# Patient Record
Sex: FEMALE | Race: ASIAN | NOT HISPANIC OR LATINO
[De-identification: names, ages, dates, MRNs, and addresses within clinical notes are randomized per-mention and may not be internally consistent; named-entity substitution may affect disease eponyms.]

---

## 2021-05-24 ENCOUNTER — RESULT REVIEW (OUTPATIENT)
Age: 31
End: 2021-05-24

## 2021-06-17 PROBLEM — Z00.00 ENCOUNTER FOR PREVENTIVE HEALTH EXAMINATION: Status: ACTIVE | Noted: 2021-06-17

## 2021-06-21 ENCOUNTER — APPOINTMENT (OUTPATIENT)
Dept: ANTEPARTUM | Facility: CLINIC | Age: 31
End: 2021-06-21
Payer: COMMERCIAL

## 2021-06-21 ENCOUNTER — ASOB RESULT (OUTPATIENT)
Age: 31
End: 2021-06-21

## 2021-06-21 PROCEDURE — 76805 OB US >/= 14 WKS SNGL FETUS: CPT

## 2021-06-21 PROCEDURE — 99072 ADDL SUPL MATRL&STAF TM PHE: CPT

## 2021-07-09 ENCOUNTER — APPOINTMENT (OUTPATIENT)
Dept: ANTEPARTUM | Facility: CLINIC | Age: 31
End: 2021-07-09
Payer: COMMERCIAL

## 2021-07-09 ENCOUNTER — ASOB RESULT (OUTPATIENT)
Age: 31
End: 2021-07-09

## 2021-07-09 DIAGNOSIS — O35.9XX0 MATERNAL CARE FOR (SUSPECTED) FETAL ABNORMALITY AND DAMAGE, UNSPECIFIED, NOT APPLICABLE OR UNSPECIFIED: ICD-10-CM

## 2021-07-09 PROCEDURE — 76816 OB US FOLLOW-UP PER FETUS: CPT

## 2021-07-09 PROCEDURE — 99072 ADDL SUPL MATRL&STAF TM PHE: CPT

## 2021-07-27 ENCOUNTER — APPOINTMENT (OUTPATIENT)
Dept: PEDIATRIC CARDIOLOGY | Facility: CLINIC | Age: 31
End: 2021-07-27
Payer: COMMERCIAL

## 2021-07-27 PROCEDURE — 99244 OFF/OP CNSLTJ NEW/EST MOD 40: CPT

## 2021-07-27 PROCEDURE — 76825 ECHO EXAM OF FETAL HEART: CPT

## 2021-07-27 PROCEDURE — 99072 ADDL SUPL MATRL&STAF TM PHE: CPT

## 2021-07-27 PROCEDURE — 76820 UMBILICAL ARTERY ECHO: CPT

## 2021-07-27 PROCEDURE — 76827 ECHO EXAM OF FETAL HEART: CPT

## 2021-07-27 PROCEDURE — 76821 MIDDLE CEREBRAL ARTERY ECHO: CPT

## 2021-07-27 PROCEDURE — 93325 DOPPLER ECHO COLOR FLOW MAPG: CPT | Mod: 59

## 2021-08-10 ENCOUNTER — APPOINTMENT (OUTPATIENT)
Dept: PEDIATRIC CARDIOLOGY | Facility: CLINIC | Age: 31
End: 2021-08-10
Payer: COMMERCIAL

## 2021-08-10 PROCEDURE — 76820 UMBILICAL ARTERY ECHO: CPT

## 2021-08-10 PROCEDURE — 76828 ECHO EXAM OF FETAL HEART: CPT | Mod: 59

## 2021-08-10 PROCEDURE — 76826 ECHO EXAM OF FETAL HEART: CPT

## 2021-08-10 PROCEDURE — 76821 MIDDLE CEREBRAL ARTERY ECHO: CPT

## 2021-08-10 PROCEDURE — 93325 DOPPLER ECHO COLOR FLOW MAPG: CPT | Mod: 59

## 2021-08-10 PROCEDURE — 99213 OFFICE O/P EST LOW 20 MIN: CPT

## 2021-09-03 ENCOUNTER — APPOINTMENT (OUTPATIENT)
Dept: ANTEPARTUM | Facility: CLINIC | Age: 31
End: 2021-09-03
Payer: COMMERCIAL

## 2021-09-03 ENCOUNTER — ASOB RESULT (OUTPATIENT)
Age: 31
End: 2021-09-03

## 2021-09-03 PROCEDURE — 76816 OB US FOLLOW-UP PER FETUS: CPT

## 2021-09-14 ENCOUNTER — APPOINTMENT (OUTPATIENT)
Dept: PEDIATRIC CARDIOLOGY | Facility: CLINIC | Age: 31
End: 2021-09-14
Payer: COMMERCIAL

## 2021-09-14 PROCEDURE — 76820 UMBILICAL ARTERY ECHO: CPT

## 2021-09-14 PROCEDURE — 93325 DOPPLER ECHO COLOR FLOW MAPG: CPT | Mod: 59

## 2021-09-14 PROCEDURE — 76821 MIDDLE CEREBRAL ARTERY ECHO: CPT

## 2021-09-14 PROCEDURE — 76827 ECHO EXAM OF FETAL HEART: CPT

## 2021-09-14 PROCEDURE — 99215 OFFICE O/P EST HI 40 MIN: CPT

## 2021-09-14 PROCEDURE — 76825 ECHO EXAM OF FETAL HEART: CPT

## 2021-10-15 ENCOUNTER — APPOINTMENT (OUTPATIENT)
Dept: ANTEPARTUM | Facility: CLINIC | Age: 31
End: 2021-10-15
Payer: COMMERCIAL

## 2021-10-15 ENCOUNTER — ASOB RESULT (OUTPATIENT)
Age: 31
End: 2021-10-15

## 2021-10-15 PROCEDURE — 76816 OB US FOLLOW-UP PER FETUS: CPT

## 2021-10-15 PROCEDURE — 76817 TRANSVAGINAL US OBSTETRIC: CPT

## 2021-10-25 ENCOUNTER — INPATIENT (INPATIENT)
Facility: HOSPITAL | Age: 31
LOS: 2 days | Discharge: HOME | End: 2021-10-28
Attending: OBSTETRICS & GYNECOLOGY | Admitting: OBSTETRICS & GYNECOLOGY
Payer: COMMERCIAL

## 2021-10-25 ENCOUNTER — TRANSCRIPTION ENCOUNTER (OUTPATIENT)
Age: 31
End: 2021-10-25

## 2021-10-25 ENCOUNTER — EMERGENCY (EMERGENCY)
Facility: HOSPITAL | Age: 31
LOS: 1 days | Discharge: STILL ADMITTED OR ADMINISTRATIVE | End: 2021-10-25
Admitting: EMERGENCY MEDICINE
Payer: SELF-PAY

## 2021-10-25 VITALS
RESPIRATION RATE: 16 BRPM | TEMPERATURE: 98 F | DIASTOLIC BLOOD PRESSURE: 82 MMHG | HEART RATE: 90 BPM | SYSTOLIC BLOOD PRESSURE: 119 MMHG

## 2021-10-25 VITALS — HEART RATE: 78 BPM | DIASTOLIC BLOOD PRESSURE: 71 MMHG | SYSTOLIC BLOOD PRESSURE: 116 MMHG

## 2021-10-25 DIAGNOSIS — Z3A.00 WEEKS OF GESTATION OF PREGNANCY NOT SPECIFIED: ICD-10-CM

## 2021-10-25 DIAGNOSIS — O20.8 OTHER HEMORRHAGE IN EARLY PREGNANCY: ICD-10-CM

## 2021-10-25 DIAGNOSIS — O42.90 PREMATURE RUPTURE OF MEMBRANES, UNSPECIFIED AS TO LENGTH OF TIME BETWEEN RUPTURE AND ONSET OF LABOR, UNSPECIFIED WEEKS OF GESTATION: ICD-10-CM

## 2021-10-25 LAB
ANISOCYTOSIS BLD QL: SLIGHT — SIGNIFICANT CHANGE UP
BASOPHILS # BLD AUTO: 0 K/UL — SIGNIFICANT CHANGE UP (ref 0–0.2)
BASOPHILS NFR BLD AUTO: 0 % — SIGNIFICANT CHANGE UP (ref 0–2)
BLD GP AB SCN SERPL QL: NEGATIVE — SIGNIFICANT CHANGE UP
COVID-19 SPIKE DOMAIN AB INTERP: POSITIVE
COVID-19 SPIKE DOMAIN ANTIBODY RESULT: >250 U/ML — HIGH
DACRYOCYTES BLD QL SMEAR: SLIGHT — SIGNIFICANT CHANGE UP
EOSINOPHIL # BLD AUTO: 0 K/UL — SIGNIFICANT CHANGE UP (ref 0–0.5)
EOSINOPHIL NFR BLD AUTO: 0 % — SIGNIFICANT CHANGE UP (ref 0–6)
GIANT PLATELETS BLD QL SMEAR: PRESENT — SIGNIFICANT CHANGE UP
GLUCOSE BLDC GLUCOMTR-MCNC: 108 MG/DL — HIGH (ref 70–99)
GLUCOSE BLDC GLUCOMTR-MCNC: 85 MG/DL — SIGNIFICANT CHANGE UP (ref 70–99)
GLUCOSE BLDC GLUCOMTR-MCNC: 85 MG/DL — SIGNIFICANT CHANGE UP (ref 70–99)
GLUCOSE BLDC GLUCOMTR-MCNC: 87 MG/DL — SIGNIFICANT CHANGE UP (ref 70–99)
GLUCOSE BLDC GLUCOMTR-MCNC: 89 MG/DL — SIGNIFICANT CHANGE UP (ref 70–99)
HCT VFR BLD CALC: 33.9 % — LOW (ref 34.5–45)
HGB BLD-MCNC: 10.8 G/DL — LOW (ref 11.5–15.5)
IANC: 9.78 K/UL — HIGH (ref 1.5–8.5)
LYMPHOCYTES # BLD AUTO: 14.4 % — SIGNIFICANT CHANGE UP (ref 13–44)
LYMPHOCYTES # BLD AUTO: 2.01 K/UL — SIGNIFICANT CHANGE UP (ref 1–3.3)
MANUAL SMEAR VERIFICATION: SIGNIFICANT CHANGE UP
MCHC RBC-ENTMCNC: 21.9 PG — LOW (ref 27–34)
MCHC RBC-ENTMCNC: 31.9 GM/DL — LOW (ref 32–36)
MCV RBC AUTO: 68.6 FL — LOW (ref 80–100)
MICROCYTES BLD QL: SLIGHT — SIGNIFICANT CHANGE UP
MONOCYTES # BLD AUTO: 0.38 K/UL — SIGNIFICANT CHANGE UP (ref 0–0.9)
MONOCYTES NFR BLD AUTO: 2.7 % — SIGNIFICANT CHANGE UP (ref 2–14)
MYELOCYTES NFR BLD: 0.9 % — HIGH (ref 0–0)
NEUTROPHILS # BLD AUTO: 10.2 K/UL — HIGH (ref 1.8–7.4)
NEUTROPHILS NFR BLD AUTO: 73 % — SIGNIFICANT CHANGE UP (ref 43–77)
OVALOCYTES BLD QL SMEAR: SLIGHT — SIGNIFICANT CHANGE UP
PLAT MORPH BLD: NORMAL — SIGNIFICANT CHANGE UP
PLATELET # BLD AUTO: 308 K/UL — SIGNIFICANT CHANGE UP (ref 150–400)
PLATELET COUNT - ESTIMATE: NORMAL — SIGNIFICANT CHANGE UP
POIKILOCYTOSIS BLD QL AUTO: SLIGHT — SIGNIFICANT CHANGE UP
POLYCHROMASIA BLD QL SMEAR: SLIGHT — SIGNIFICANT CHANGE UP
RBC # BLD: 4.94 M/UL — SIGNIFICANT CHANGE UP (ref 3.8–5.2)
RBC # FLD: 17.1 % — HIGH (ref 10.3–14.5)
RBC BLD AUTO: ABNORMAL
RH IG SCN BLD-IMP: POSITIVE — SIGNIFICANT CHANGE UP
RH IG SCN BLD-IMP: POSITIVE — SIGNIFICANT CHANGE UP
SARS-COV-2 IGG+IGM SERPL QL IA: >250 U/ML — HIGH
SARS-COV-2 IGG+IGM SERPL QL IA: POSITIVE
SARS-COV-2 RNA SPEC QL NAA+PROBE: SIGNIFICANT CHANGE UP
T PALLIDUM AB TITR SER: NEGATIVE — SIGNIFICANT CHANGE UP
VARIANT LYMPHS # BLD: 9 % — HIGH (ref 0–6)
WBC # BLD: 13.97 K/UL — HIGH (ref 3.8–10.5)
WBC # FLD AUTO: 13.97 K/UL — HIGH (ref 3.8–10.5)

## 2021-10-25 PROCEDURE — L9996: CPT

## 2021-10-25 RX ORDER — AMPICILLIN TRIHYDRATE 250 MG
1 CAPSULE ORAL EVERY 4 HOURS
Refills: 0 | Status: DISCONTINUED | OUTPATIENT
Start: 2021-10-25 | End: 2021-10-26

## 2021-10-25 RX ORDER — BENZOYL PEROXIDE MICRONIZED 5.8 %
1 TOWELETTE (EA) TOPICAL
Qty: 0 | Refills: 0 | DISCHARGE

## 2021-10-25 RX ORDER — METRONIDAZOLE 500 MG
0 TABLET ORAL
Qty: 0 | Refills: 0 | DISCHARGE

## 2021-10-25 RX ORDER — SODIUM CHLORIDE 9 MG/ML
1000 INJECTION INTRAMUSCULAR; INTRAVENOUS; SUBCUTANEOUS
Refills: 0 | Status: DISCONTINUED | OUTPATIENT
Start: 2021-10-25 | End: 2021-10-26

## 2021-10-25 RX ORDER — SODIUM CHLORIDE 9 MG/ML
1000 INJECTION, SOLUTION INTRAVENOUS
Refills: 0 | Status: DISCONTINUED | OUTPATIENT
Start: 2021-10-25 | End: 2021-10-25

## 2021-10-25 RX ORDER — AMPICILLIN TRIHYDRATE 250 MG
2 CAPSULE ORAL ONCE
Refills: 0 | Status: COMPLETED | OUTPATIENT
Start: 2021-10-25 | End: 2021-10-25

## 2021-10-25 RX ORDER — AMPICILLIN TRIHYDRATE 250 MG
1 CAPSULE ORAL EVERY 4 HOURS
Refills: 0 | Status: DISCONTINUED | OUTPATIENT
Start: 2021-10-25 | End: 2021-10-25

## 2021-10-25 RX ORDER — OXYTOCIN 10 UNIT/ML
333.33 VIAL (ML) INJECTION
Qty: 20 | Refills: 0 | Status: DISCONTINUED | OUTPATIENT
Start: 2021-10-25 | End: 2021-10-25

## 2021-10-25 RX ORDER — SODIUM CHLORIDE 9 MG/ML
1000 INJECTION, SOLUTION INTRAVENOUS
Refills: 0 | Status: DISCONTINUED | OUTPATIENT
Start: 2021-10-25 | End: 2021-10-26

## 2021-10-25 RX ORDER — OXYTOCIN 10 UNIT/ML
333.33 VIAL (ML) INJECTION
Qty: 20 | Refills: 0 | Status: DISCONTINUED | OUTPATIENT
Start: 2021-10-25 | End: 2021-10-26

## 2021-10-25 RX ORDER — SODIUM CHLORIDE 9 MG/ML
1000 INJECTION INTRAMUSCULAR; INTRAVENOUS; SUBCUTANEOUS
Refills: 0 | Status: DISCONTINUED | OUTPATIENT
Start: 2021-10-25 | End: 2021-10-25

## 2021-10-25 RX ADMIN — SODIUM CHLORIDE 125 MILLILITER(S): 9 INJECTION INTRAMUSCULAR; INTRAVENOUS; SUBCUTANEOUS at 10:55

## 2021-10-25 RX ADMIN — Medication 108 GRAM(S): at 21:28

## 2021-10-25 RX ADMIN — SODIUM CHLORIDE 125 MILLILITER(S): 9 INJECTION, SOLUTION INTRAVENOUS at 21:36

## 2021-10-25 RX ADMIN — Medication 108 GRAM(S): at 09:29

## 2021-10-25 RX ADMIN — Medication 216 GRAM(S): at 05:45

## 2021-10-25 RX ADMIN — Medication 108 GRAM(S): at 13:44

## 2021-10-25 RX ADMIN — Medication 0.25 MILLIGRAM(S): at 19:48

## 2021-10-25 RX ADMIN — Medication 108 GRAM(S): at 17:39

## 2021-10-25 NOTE — ED ADULT TRIAGE NOTE - NS ED NURSE NOTE DISPO AOU DETAILS FT
[facility]

## 2021-10-25 NOTE — OB PROVIDER H&P - ASSESSMENT
32 yo  @ 37.2 wks c/o brb at 2am while laying in bed, placed pad on filled pad and replaced and came here- no active bleeding currently. no pain. never had bleeding in this pregnancy before- hx of LLP last sono 10/15 reports LLP resolved and >2 cm away from internal os. pt reports has an vaginal infection and started first dose of antibiotics last evening. denies fever chills ha n/v new swelling vision changes epigastric pain cp sob or cough.    GBS: positive 10/23  meds: PNV iron and antibiotic  all: denies  PMH: denies  PSH: d&c  gyn hx: denies  ob hx: TOP 2013 x 1    d/w Dr Stubbs admit for [place] PROM Cat 2 tracing @ 37.2 wks  for PO Cytotec  for Ampicillin for +GBS  prenatals reviewed  covid swab sent  2 units PRBC on hold

## 2021-10-25 NOTE — OB PROVIDER IHI INDUCTION/AUGMENTATION NOTE - NS_CHECKALL_OBGYN_ALL_OB
Order was written/H&P was completed/Contractions pattern was reviewed/FHR was reviewed/Induction / Augmentation was discussed none

## 2021-10-25 NOTE — ED ADULT TRIAGE NOTE - CHIEF COMPLAINT QUOTE
37 weeks pregnant. presents to ED C/O heavy vaginal bleeding starting 1 hour ago. endorsing cramping. denies PMH. L&D notified patient to be seen in L&D

## 2021-10-25 NOTE — DISCHARGE NOTE OB - PATIENT PORTAL LINK FT
You can access the FollowMyHealth Patient Portal offered by [facility] by registering at the following website: [email or web address]. By joining [employer]’s FollowMyHealth portal, you will also be able to view your health information using other applications (apps) compatible with our system.

## 2021-10-25 NOTE — DISCHARGE NOTE OB - MEDICATION SUMMARY - MEDICATIONS TO TAKE
I will START or STAY ON the medications listed below when I get home from the hospital:    Tylenol 500 mg oral tablet  -- 2 tab(s) by mouth every 6 hours, As Needed  -- Indication: For Pain    ibuprofen 200 mg oral tablet  -- 3 tab(s) by mouth every 6 hours, As Needed  -- Indication: For Pain    PNV Prenatal oral tablet  -- 1 tab(s) by mouth once a day  -- Indication: For Home med

## 2021-10-25 NOTE — OB PROVIDER H&P - HISTORY OF PRESENT ILLNESS
32 yo  @ 37.2 wks c/o brb at 2am while laying in bed, placed pad on filled pad and replaced and came here- no active bleeding currently. no pain. never had bleeding in this pregnancy before- hx of LLP last sono 10/15 reports LLP resolved and >2 cm away from internal os, GDMA1- not checking blood sugars, on antibiotic for vaginal infection, +GBS pt reports has an vaginal infection and started first dose of antibiotics last evening. denies fever chills ha n/v new swelling vision changes epigastric pain cp sob or cough.    GBS: positive 10/23  meds: PNV iron and antibiotic  all: denies  PMH: denies  PSH: d&c  gyn hx: denies  ob hx: TOP 2013 x 1

## 2021-10-25 NOTE — DISCHARGE NOTE OB - HOSPITAL COURSE
Admitted with PROM.  GDMA1.  Fetal alert due to cardiac anomalies. Admitted with PROM.  GDMA1.  Fetal alert due to cardiac anomalies.  Uncomplicated .  Routine pp care.

## 2021-10-25 NOTE — OB PROVIDER TRIAGE NOTE - HISTORY OF PRESENT ILLNESS
32 yo  @ 37.2 wks c/o brb at 2am while laying in bed, placed pad on filled pad and replaced and came here- no active bleeding currently. no pain. never had bleeding in this pregnancy before- hx of LLP last sono 10/15 reports LLP resolved and >2 cm away from internal os. pt reports has an vaginal infection and started first dose of antibiotics last evening. denies fever chills ha n/v new swelling vision changes epigastric pain cp sob or cough.    GBS: positive 10/23  meds: PNV iron and antibiotic  all: denies  PMH: denies  PSH: d&c  gyn hx: denies  ob hx: TOP 2013 x 1

## 2021-10-25 NOTE — OB PROVIDER H&P - NSPREVIOUSLIVEBIR_OBGYN_ALL_OB
Home Oxygen Evaluation      Oxygen Saturation at rest on room air is: 85%  Oxygen Saturation at rest on 2 LPM NC is: 92%    Oxygen Saturation on 8 LPM is: 92%   Patient desaturated to 82% when she stood. MD was notified    Recommendation: Patient requires 2 LPM at rest and 8 LPM NC with exertion.      No

## 2021-10-25 NOTE — OB PROVIDER TRIAGE NOTE - NS_FETALANOMALIESDETAILS_OBGYN_ALL_OB_FT
Aortic valve annulus measures on the lower range of normal. The valve leaflets appears thickened.  Mildly hypoplastic arch.  Urgent, inpatient  pediatric cardiology evaluation recommended in NICU.

## 2021-10-25 NOTE — OB PROVIDER TRIAGE NOTE - NSHPPHYSICALEXAM_GEN_ALL_CORE
LS clear bilaterally  abd soft gravid NT  CV RRR  TAS: vertex  posterior placenta  BPP 6/8  PETEY: 2.7  EFW: 2513g/5#9  SSE: red clot in vault no active bleeding, blood clot at os  SVE: closed 50/-3  FHT: moderate variability, + accels, + decel @ 0446- turned repositioned 02 in place IV started  toco: irregular pt not feeling 0/10 pain scale  Vital Signs Last 24 Hrs  T(C): 36.8 (25 Oct 2021 04:06), Max: 36.8 (25 Oct 2021 04:06)  T(F): 98.2 (25 Oct 2021 04:06), Max: 98.2 (25 Oct 2021 04:06)  HR: 78 (25 Oct 2021 04:06) (78 - 90)  BP: 116/71 (25 Oct 2021 04:06) (116/71 - 119/82)  BP(mean): --  RR: 17 (25 Oct 2021 04:06) (16 - 17)  SpO2: --

## 2021-10-25 NOTE — OB RN PATIENT PROFILE - MENTAL HEALTH CONDITIONS/SYMPTOMS, PROFILE
Was on therapy , no current symptoms/anxiety disorder Was in therapy , no current symptoms/anxiety disorder

## 2021-10-25 NOTE — OB RN TRIAGE NOTE - NSMATERNALFETALCONCERNS_OBGYN_ALL_OB_FT
Fetal Alert  Aortic valve annulus measures on the lower range of normal. The valve leaflets appears thickened.  Mildly hypoplastic arch.  Urgent, inpatient  pediatric cardiology evaluation recommended in NICU.  Rama Sen RN,MSN

## 2021-10-25 NOTE — OB PROVIDER H&P - NSRISKFACTORSDETA_OBGYN_ALL_OB
Gestational Diabetes/Other serious chronic Disease/Other Vaginal Bleeding/Referral for High Risk Care

## 2021-10-25 NOTE — OB RN PATIENT PROFILE - CURRENT PREGNANCY COMPLICATIONS, OB PROFILE
GDMA1, fetal alaert- aorta for NICU eval/Gestational Diabetes/Other GDMA1, fetal alert- aorta for NICU eval/Gestational Diabetes/Other

## 2021-10-25 NOTE — OB RN TRIAGE NOTE - NS_FETALANOMALIESDETAILS_OBGYN_ALL_OB_FT
Fetal AlertAortic valve annulus measures on the lower range of normal. The valve leaflets appears thickened.  Mildly hypoplastic arch.  Urgent, inpatient  pediatric cardiology evaluation recommended in NICU. Aortic valve annulus measures on the lower range of normal. The valve leaflets appears thickened.  Mildly hypoplastic arch.  Urgent, inpatient  pediatric cardiology evaluation recommended in NICU.

## 2021-10-26 ENCOUNTER — RESULT REVIEW (OUTPATIENT)
Age: 31
End: 2021-10-26

## 2021-10-26 LAB
GLUCOSE BLDC GLUCOMTR-MCNC: 101 MG/DL — HIGH (ref 70–99)
GLUCOSE BLDC GLUCOMTR-MCNC: 93 MG/DL — SIGNIFICANT CHANGE UP (ref 70–99)
GLUCOSE BLDC GLUCOMTR-MCNC: 94 MG/DL — SIGNIFICANT CHANGE UP (ref 70–99)
GLUCOSE BLDC GLUCOMTR-MCNC: 98 MG/DL — SIGNIFICANT CHANGE UP (ref 70–99)

## 2021-10-26 PROCEDURE — 88307 TISSUE EXAM BY PATHOLOGIST: CPT | Mod: 26

## 2021-10-26 RX ORDER — OXYCODONE HYDROCHLORIDE 5 MG/1
5 TABLET ORAL
Refills: 0 | Status: DISCONTINUED | OUTPATIENT
Start: 2021-10-26 | End: 2021-10-28

## 2021-10-26 RX ORDER — SODIUM CHLORIDE 9 MG/ML
3 INJECTION INTRAMUSCULAR; INTRAVENOUS; SUBCUTANEOUS EVERY 8 HOURS
Refills: 0 | Status: DISCONTINUED | OUTPATIENT
Start: 2021-10-26 | End: 2021-10-28

## 2021-10-26 RX ORDER — PRAMOXINE HYDROCHLORIDE 150 MG/15G
1 AEROSOL, FOAM RECTAL EVERY 4 HOURS
Refills: 0 | Status: DISCONTINUED | OUTPATIENT
Start: 2021-10-26 | End: 2021-10-28

## 2021-10-26 RX ORDER — HYDROCORTISONE 1 %
1 OINTMENT (GRAM) TOPICAL EVERY 6 HOURS
Refills: 0 | Status: DISCONTINUED | OUTPATIENT
Start: 2021-10-26 | End: 2021-10-28

## 2021-10-26 RX ORDER — OXYTOCIN 10 UNIT/ML
333.33 VIAL (ML) INJECTION
Qty: 20 | Refills: 0 | Status: DISCONTINUED | OUTPATIENT
Start: 2021-10-26 | End: 2021-10-27

## 2021-10-26 RX ORDER — SIMETHICONE 80 MG/1
80 TABLET, CHEWABLE ORAL EVERY 4 HOURS
Refills: 0 | Status: DISCONTINUED | OUTPATIENT
Start: 2021-10-26 | End: 2021-10-28

## 2021-10-26 RX ORDER — OXYTOCIN 10 UNIT/ML
2 VIAL (ML) INJECTION
Qty: 30 | Refills: 0 | Status: DISCONTINUED | OUTPATIENT
Start: 2021-10-26 | End: 2021-10-26

## 2021-10-26 RX ORDER — LANOLIN
1 OINTMENT (GRAM) TOPICAL EVERY 6 HOURS
Refills: 0 | Status: DISCONTINUED | OUTPATIENT
Start: 2021-10-26 | End: 2021-10-28

## 2021-10-26 RX ORDER — DIPHENHYDRAMINE HCL 50 MG
25 CAPSULE ORAL EVERY 6 HOURS
Refills: 0 | Status: DISCONTINUED | OUTPATIENT
Start: 2021-10-26 | End: 2021-10-28

## 2021-10-26 RX ORDER — DIBUCAINE 1 %
1 OINTMENT (GRAM) RECTAL EVERY 6 HOURS
Refills: 0 | Status: DISCONTINUED | OUTPATIENT
Start: 2021-10-26 | End: 2021-10-28

## 2021-10-26 RX ORDER — SODIUM CHLORIDE 9 MG/ML
300 INJECTION INTRAMUSCULAR; INTRAVENOUS; SUBCUTANEOUS ONCE
Refills: 0 | Status: COMPLETED | OUTPATIENT
Start: 2021-10-26 | End: 2021-10-26

## 2021-10-26 RX ORDER — TETANUS TOXOID, REDUCED DIPHTHERIA TOXOID AND ACELLULAR PERTUSSIS VACCINE, ADSORBED 5; 2.5; 8; 8; 2.5 [IU]/.5ML; [IU]/.5ML; UG/.5ML; UG/.5ML; UG/.5ML
0.5 SUSPENSION INTRAMUSCULAR ONCE
Refills: 0 | Status: COMPLETED | OUTPATIENT
Start: 2021-10-26

## 2021-10-26 RX ORDER — KETOROLAC TROMETHAMINE 30 MG/ML
30 SYRINGE (ML) INJECTION ONCE
Refills: 0 | Status: DISCONTINUED | OUTPATIENT
Start: 2021-10-26 | End: 2021-10-27

## 2021-10-26 RX ORDER — IBUPROFEN 200 MG
600 TABLET ORAL EVERY 6 HOURS
Refills: 0 | Status: DISCONTINUED | OUTPATIENT
Start: 2021-10-26 | End: 2021-10-28

## 2021-10-26 RX ORDER — MAGNESIUM HYDROXIDE 400 MG/1
30 TABLET, CHEWABLE ORAL
Refills: 0 | Status: DISCONTINUED | OUTPATIENT
Start: 2021-10-26 | End: 2021-10-28

## 2021-10-26 RX ORDER — AER TRAVELER 0.5 G/1
1 SOLUTION RECTAL; TOPICAL EVERY 4 HOURS
Refills: 0 | Status: DISCONTINUED | OUTPATIENT
Start: 2021-10-26 | End: 2021-10-28

## 2021-10-26 RX ORDER — OXYCODONE HYDROCHLORIDE 5 MG/1
5 TABLET ORAL ONCE
Refills: 0 | Status: DISCONTINUED | OUTPATIENT
Start: 2021-10-26 | End: 2021-10-28

## 2021-10-26 RX ORDER — SODIUM CHLORIDE 9 MG/ML
1000 INJECTION INTRAMUSCULAR; INTRAVENOUS; SUBCUTANEOUS
Refills: 0 | Status: DISCONTINUED | OUTPATIENT
Start: 2021-10-26 | End: 2021-10-26

## 2021-10-26 RX ORDER — BENZOCAINE 10 %
1 GEL (GRAM) MUCOUS MEMBRANE EVERY 6 HOURS
Refills: 0 | Status: DISCONTINUED | OUTPATIENT
Start: 2021-10-26 | End: 2021-10-28

## 2021-10-26 RX ORDER — ACETAMINOPHEN 500 MG
975 TABLET ORAL
Refills: 0 | Status: DISCONTINUED | OUTPATIENT
Start: 2021-10-26 | End: 2021-10-28

## 2021-10-26 RX ORDER — IBUPROFEN 200 MG
600 TABLET ORAL EVERY 6 HOURS
Refills: 0 | Status: COMPLETED | OUTPATIENT
Start: 2021-10-26 | End: 2022-09-24

## 2021-10-26 RX ADMIN — SODIUM CHLORIDE 125 MILLILITER(S): 9 INJECTION INTRAMUSCULAR; INTRAVENOUS; SUBCUTANEOUS at 10:33

## 2021-10-26 RX ADMIN — SODIUM CHLORIDE 600 MILLILITER(S): 9 INJECTION INTRAMUSCULAR; INTRAVENOUS; SUBCUTANEOUS at 10:35

## 2021-10-26 RX ADMIN — Medication 975 MILLIGRAM(S): at 23:45

## 2021-10-26 RX ADMIN — OXYCODONE HYDROCHLORIDE 5 MILLIGRAM(S): 5 TABLET ORAL at 23:27

## 2021-10-26 RX ADMIN — Medication 2 MILLIUNIT(S)/MIN: at 04:07

## 2021-10-26 RX ADMIN — Medication 108 GRAM(S): at 02:10

## 2021-10-26 RX ADMIN — Medication 108 GRAM(S): at 10:32

## 2021-10-26 RX ADMIN — Medication 2 MILLIUNIT(S)/MIN: at 10:34

## 2021-10-26 RX ADMIN — Medication 108 GRAM(S): at 15:02

## 2021-10-26 RX ADMIN — SODIUM CHLORIDE 125 MILLILITER(S): 9 INJECTION, SOLUTION INTRAVENOUS at 10:34

## 2021-10-26 RX ADMIN — Medication 975 MILLIGRAM(S): at 22:45

## 2021-10-26 NOTE — CHART NOTE - NSCHARTNOTEFT_GEN_A_CORE
OB Attending    Pt seen at bedside and plan of care discussed.  All questions answered.  Plan for continued induction of labor with cervical balloon and PO cytotec.    MD Devin
OB attending Labor note:  patient comfortable s/p epidural  VSS  tracing with deep variables which responded to LLP and O2- now category 1  contractions every 2-4 minutes  VE: 3/90/high  pitocin off  A: stable  P: allow tracing to recover then restart pitocin      I informed patient that if decelerations keep happening a c/s will be warranted
Ob Attending Note    Patient seen and evaluated at bedside.  Pushing w/ contractions.  SVE 10/100/+1.   mild caput noted.   Instructed patient on alternate pushing techniques.   EFM w/ moderate variability in between contractions.  Variables noted with contractions.  IUPC in place with amnioinfusion running.   Overall reassuring.   Will continue IV fluids, amnioinfusion.  continue maternal pushing efforts.   Anticipate .     TAVARES Stubbs MD
pt just received epidural bolus from anesthesia    Vital Signs Last 24 Hrs  T(C): 36.8 (26 Oct 2021 08:00), Max: 36.9 (25 Oct 2021 15:28)  T(F): 98.24 (26 Oct 2021 08:00), Max: 98.42 (25 Oct 2021 15:28)  HR: 84 (26 Oct 2021 10:10) (60 - 104)  BP: 119/- (26 Oct 2021 10:10) (79/47 - 139/62)  SpO2: 100% (26 Oct 2021 09:56) (91% - 100%)    fht 140s mod variability, + accels, no decelt  toco- ctx q 2-3 min spontaneously    a/p PROM at term  fht now category 1  cont current mgmt
Pt comfortable with epidural  IUPC replaced  ve 3-4 /70/-3  fht 140s mod variability, + accels, variable and late decels  pitocin discontinued  pt repositioned  amnioinfusion    cat 2 fht   cont intrauterine resuscitation  reviewed with pt concern for fht remote from vd  cont close monitoring
Pt vomiting and feeling more pain  ve 10/100/0  ise replaced  fht discontinuous; after ise placed 139swith variable decels with contractions    toco ctx q 2-5 min  pitocin held  for restart of amnioinfusion that was discontinued    pt still has no urge to push  for epidural bolus    cont intrauterine resuscitation
pt feeling more pain  ve 10/100/0  fht 135, mod variability, + accels, occ early and variable decels  toco ctx q 3-4 min    a/p pt stable. fht cat 2  for epidural bolus  pt has no urge to push yet  cont current mgmt
pt feeling more rectal pressure  ve 10/100/+1  fht 130s, mod variability, variable decels with ctx  toco ctx q 3-4 min    will have pt start pushing

## 2021-10-26 NOTE — OB PROVIDER DELIVERY SUMMARY - AS DELIV COMPLICATIONS OB
abnormal fetal heart rate tracing/prolonged rupture of membranes/premature rupture of membranes prior to labor

## 2021-10-26 NOTE — OB PROVIDER LABOR PROGRESS NOTE - NS_SUBJECTIVE/OBJECTIVE_OBGYN_ALL_OB_FT
Patient seen and evaluated at bedside for prolonged deceleration. Patient positioned onto her left side, O2 mask applied, IVF running. Patient last received Cytotec first dose of 40mcg at 5:30pm. Tachysystole noted, so terbutaline 0.25mg administered IM.     Vital Signs Last 24 Hrs  T(C): 36.8 (25 Oct 2021 19:24), Max: 36.9 (25 Oct 2021 15:28)  T(F): 98.24 (25 Oct 2021 19:24), Max: 98.42 (25 Oct 2021 15:28)  HR: 73 (25 Oct 2021 19:51) (60 - 96)  BP: 97/55 (25 Oct 2021 19:35) (79/47 - 126/76)  RR: 15 (25 Oct 2021 05:19) (15 - 17)  SpO2: 100% (25 Oct 2021 19:46) (93% - 100%)
CB
Pt seen for cervical balloon sp epidural
Pt seen for placement of cervical balloon. Unable to give PO cytotec due to cat 2 tracing.

## 2021-10-26 NOTE — OB NEONATOLOGY/PEDIATRICIAN DELIVERY SUMMARY - NSPEDSNEONOTESA_OBGYN_ALL_OB_FT
37.3 wk male born via  to a 30 y/o [id number] mother. Maternal history of anxiety (therapy), hx of gDMA1. Prenatal history uncomplicated. Blood type B+, HIV[-], Hep B[-], RPR [NR], Rubella [I], GBS [+] on 10/21 s/p ampx9. SROM at 0200 10/25 with bloody fluids. Baby emerged vigorous, crying, was w/d/s/s. APGARS 9/9. Mom plans to initiate exclusive breastfeeding, consents to Hep B vaccine and requests circ. EOS 0.14. COVID negative 10/12.

## 2021-10-26 NOTE — CHART NOTE - NSCHARTNOTESELECT_GEN_ALL_CORE
covering ob attending note/Event Note
ob attending note
ob attending note/Event Note
ob attending note/Event Note
Event Note
Event Note
attending note/Event Note
labor

## 2021-10-26 NOTE — OB RN DELIVERY SUMMARY - NS_SEPSISRSKCALC_OBGYN_ALL_OB_FT
EOS calculated successfully. EOS Risk Factor: 0.5/1000 live births ([facility] national incidence); GA=37w3d; Temp=98.42; ROM=40.35; GBS='Positive'; Antibiotics='GBS specific antibiotics > 2 hrs prior to birth'

## 2021-10-26 NOTE — OB PROVIDER LABOR PROGRESS NOTE - NS_OBIHIFHRDETAILS_OBGYN_ALL_OB_FT
150/mod/+accels interm decels
145/mod/+accel/1 prolonged decel to thee of 90 x6-7min that recovered after resuscitative measures
150/mod spencer/+ accels/late decels  overall reassuring
140/mod spencer/+ accels/no decels

## 2021-10-26 NOTE — OB RN DELIVERY SUMMARY - NSSELHIDDEN_OBGYN_ALL_OB_FT
[NS_DeliveryAttending1_OBGYN_ALL_OB_FT:[id number]],[NS_DeliveryRN_OBGYN_ALL_OB_FT:[id number]=]

## 2021-10-26 NOTE — OB PROVIDER DELIVERY SUMMARY - NSPROVIDERDELIVERYNOTE_OBGYN_ALL_OB_FT
of viable infant.  Head, shoulders and body delivered easily.   Cord clamped and cut after delayed cord clamping performed.   Cord traction applied for placental delivery.  Very thin friable cord noted.  Partial cord avulsion noted.  Decision made for manual removal of placenta.   Manual removal performed w/o difficulty.   Placenta inspected and noted to be intact.   Placenta sent to pathology.   Vaginal exam revealed intact cervix, vaginal walls and sulci.   2nd degree laceration and left labial laceration identified and repaired with 2-0 chromic suture in usual fashion.  Excellent hemostasis.

## 2021-10-26 NOTE — OB RN DELIVERY SUMMARY - BABY A: WEIGHT IN OUNCES (FROM GRAMS), DELIVERY
Charting and patient care of [address] Kemal by Gely Diane from 9/25/20 1930 to 9/26/20 0815 was supervised and reviewed by this RN. 4

## 2021-10-27 RX ADMIN — OXYCODONE HYDROCHLORIDE 5 MILLIGRAM(S): 5 TABLET ORAL at 00:20

## 2021-10-27 RX ADMIN — Medication 975 MILLIGRAM(S): at 13:26

## 2021-10-27 RX ADMIN — Medication 975 MILLIGRAM(S): at 14:04

## 2021-10-27 RX ADMIN — OXYCODONE HYDROCHLORIDE 5 MILLIGRAM(S): 5 TABLET ORAL at 23:28

## 2021-10-27 RX ADMIN — Medication 1 TABLET(S): at 13:26

## 2021-10-27 RX ADMIN — Medication 975 MILLIGRAM(S): at 06:40

## 2021-10-27 RX ADMIN — Medication 975 MILLIGRAM(S): at 05:43

## 2021-10-27 RX ADMIN — OXYCODONE HYDROCHLORIDE 5 MILLIGRAM(S): 5 TABLET ORAL at 05:30

## 2021-10-27 RX ADMIN — Medication 975 MILLIGRAM(S): at 23:28

## 2021-10-27 RX ADMIN — OXYCODONE HYDROCHLORIDE 5 MILLIGRAM(S): 5 TABLET ORAL at 14:04

## 2021-10-27 RX ADMIN — OXYCODONE HYDROCHLORIDE 5 MILLIGRAM(S): 5 TABLET ORAL at 04:29

## 2021-10-27 NOTE — PROGRESS NOTE ADULT - SUBJECTIVE AND OBJECTIVE BOX
S: Patient doing well. Minimal lochia. Pain controlled.    O: Vital Signs Last 24 Hrs  T(C): 37.6 (27 Oct 2021 05:58), Max: 37.6 (27 Oct 2021 05:58)  T(F): 99.7 (27 Oct 2021 05:58), Max: 99.7 (27 Oct 2021 05:58)  HR: 86 (27 Oct 2021 05:58) (64 - 184)  BP: 124/65 (27 Oct 2021 05:58) (95/56 - 167/64)  BP(mean): --  RR: 18 (27 Oct 2021 05:58) (18 - 18)  SpO2: 100% (27 Oct 2021 05:58) (90% - 100%)    Gen: NAD  Abd: soft, NT, ND, fundus firm below umbilicus  Lochia: moderate  Ext: no tenderness    Labs:      A: 31y PPD#1 s/p  doing well.    Plan: continue post partum care  discharge 10/28  baby in NICU, may need cardiac surgery - patient has follow up with isai in NICU   ORALIA Khanna

## 2021-10-28 VITALS
DIASTOLIC BLOOD PRESSURE: 72 MMHG | TEMPERATURE: 97 F | OXYGEN SATURATION: 99 % | SYSTOLIC BLOOD PRESSURE: 114 MMHG | HEART RATE: 76 BPM

## 2021-10-28 RX ORDER — TETANUS TOXOID, REDUCED DIPHTHERIA TOXOID AND ACELLULAR PERTUSSIS VACCINE, ADSORBED 5; 2.5; 8; 8; 2.5 [IU]/.5ML; [IU]/.5ML; UG/.5ML; UG/.5ML; UG/.5ML
0.5 SUSPENSION INTRAMUSCULAR ONCE
Refills: 0 | Status: COMPLETED | OUTPATIENT
Start: 2021-10-28 | End: 2021-10-28

## 2021-10-28 RX ADMIN — Medication 975 MILLIGRAM(S): at 00:49

## 2021-10-28 RX ADMIN — Medication 975 MILLIGRAM(S): at 14:00

## 2021-10-28 RX ADMIN — Medication 975 MILLIGRAM(S): at 12:34

## 2021-10-28 RX ADMIN — TETANUS TOXOID, REDUCED DIPHTHERIA TOXOID AND ACELLULAR PERTUSSIS VACCINE, ADSORBED 0.5 MILLILITER(S): 5; 2.5; 8; 8; 2.5 SUSPENSION INTRAMUSCULAR at 01:05

## 2021-10-28 RX ADMIN — Medication 975 MILLIGRAM(S): at 07:00

## 2021-10-28 RX ADMIN — OXYCODONE HYDROCHLORIDE 5 MILLIGRAM(S): 5 TABLET ORAL at 00:50

## 2021-10-28 RX ADMIN — Medication 975 MILLIGRAM(S): at 08:00

## 2021-11-14 LAB — SURGICAL PATHOLOGY STUDY: SIGNIFICANT CHANGE UP

## 2022-06-11 PROBLEM — Z78.9 OTHER SPECIFIED HEALTH STATUS: Chronic | Status: ACTIVE | Noted: 2021-10-25

## 2022-06-29 ENCOUNTER — APPOINTMENT (OUTPATIENT)
Dept: ANTEPARTUM | Facility: CLINIC | Age: 32
End: 2022-06-29

## 2022-06-29 ENCOUNTER — ASOB RESULT (OUTPATIENT)
Age: 32
End: 2022-06-29

## 2022-06-29 PROCEDURE — 76811 OB US DETAILED SNGL FETUS: CPT

## 2022-07-19 ENCOUNTER — APPOINTMENT (OUTPATIENT)
Dept: PEDIATRIC CARDIOLOGY | Facility: CLINIC | Age: 32
End: 2022-07-19

## 2022-07-19 PROCEDURE — 76825 ECHO EXAM OF FETAL HEART: CPT

## 2022-07-19 PROCEDURE — 93325 DOPPLER ECHO COLOR FLOW MAPG: CPT | Mod: 59

## 2022-07-19 PROCEDURE — 76820 UMBILICAL ARTERY ECHO: CPT

## 2022-07-19 PROCEDURE — 76827 ECHO EXAM OF FETAL HEART: CPT

## 2022-07-19 PROCEDURE — 99242 OFF/OP CONSLTJ NEW/EST SF 20: CPT | Mod: 25

## 2022-08-22 NOTE — OB PROVIDER H&P - NS_PRENATALLABSOURCEGBS36_OBGYN_ALL_OB
Return to the ED if you have worsening shoulder pain, numbness, or weakness.  
hard copy, drawn during this pregnancy

## 2022-08-27 NOTE — OB PROVIDER H&P - NS_FETALMONCTXRES_OBGYN_ALL_OB
Patient afebrile and had no falls this shift. Fundus firm without massage and below umbilicus. Bleeding light, no clots passed this shift. Voids spontaneously. Ambulates independently. Pain well controlled with oral pain medication. Vital signs stable at this time. Bonding well with infant; responds to infant cues and participates in infant care. Mom is breastfeeding and help set up her personal pump from home. Giving baby EBM with a nipple. Will continue to monitor.    
Relaxed

## 2022-09-02 ENCOUNTER — APPOINTMENT (OUTPATIENT)
Dept: PEDIATRIC CARDIOLOGY | Facility: CLINIC | Age: 32
End: 2022-09-02

## 2022-09-07 ENCOUNTER — APPOINTMENT (OUTPATIENT)
Dept: PEDIATRIC CARDIOLOGY | Facility: CLINIC | Age: 32
End: 2022-09-07

## 2022-09-07 PROCEDURE — 76826 ECHO EXAM OF FETAL HEART: CPT

## 2022-09-07 PROCEDURE — 99214 OFFICE O/P EST MOD 30 MIN: CPT

## 2022-09-07 PROCEDURE — 76820 UMBILICAL ARTERY ECHO: CPT

## 2022-09-07 PROCEDURE — 93325 DOPPLER ECHO COLOR FLOW MAPG: CPT | Mod: 59

## 2022-09-07 PROCEDURE — 76821 MIDDLE CEREBRAL ARTERY ECHO: CPT

## 2022-09-07 PROCEDURE — 76828 ECHO EXAM OF FETAL HEART: CPT

## 2022-10-25 ENCOUNTER — APPOINTMENT (OUTPATIENT)
Dept: MATERNAL FETAL MEDICINE | Facility: CLINIC | Age: 32
End: 2022-10-25

## 2022-10-29 ENCOUNTER — RESULT REVIEW (OUTPATIENT)
Age: 32
End: 2022-10-29

## 2022-10-29 ENCOUNTER — TRANSCRIPTION ENCOUNTER (OUTPATIENT)
Age: 32
End: 2022-10-29

## 2022-10-29 ENCOUNTER — INPATIENT (INPATIENT)
Facility: HOSPITAL | Age: 32
LOS: 1 days | Discharge: HOME | End: 2022-10-31
Attending: OBSTETRICS & GYNECOLOGY | Admitting: OBSTETRICS & GYNECOLOGY

## 2022-10-29 VITALS — HEART RATE: 101 BPM | SYSTOLIC BLOOD PRESSURE: 113 MMHG | OXYGEN SATURATION: 100 % | DIASTOLIC BLOOD PRESSURE: 80 MMHG

## 2022-10-29 DIAGNOSIS — O24.410 GESTATIONAL DIABETES MELLITUS IN PREGNANCY, DIET CONTROLLED: ICD-10-CM

## 2022-10-29 DIAGNOSIS — Z3A.00 WEEKS OF GESTATION OF PREGNANCY NOT SPECIFIED: ICD-10-CM

## 2022-10-29 DIAGNOSIS — O26.899 OTHER SPECIFIED PREGNANCY RELATED CONDITIONS, UNSPECIFIED TRIMESTER: ICD-10-CM

## 2022-10-29 LAB
BASOPHILS # BLD AUTO: 0.06 K/UL — SIGNIFICANT CHANGE UP (ref 0–0.2)
BASOPHILS NFR BLD AUTO: 0.4 % — SIGNIFICANT CHANGE UP (ref 0–2)
BLD GP AB SCN SERPL QL: NEGATIVE — SIGNIFICANT CHANGE UP
EOSINOPHIL # BLD AUTO: 0.07 K/UL — SIGNIFICANT CHANGE UP (ref 0–0.5)
EOSINOPHIL NFR BLD AUTO: 0.5 % — SIGNIFICANT CHANGE UP (ref 0–6)
HCT VFR BLD CALC: 36.8 % — SIGNIFICANT CHANGE UP (ref 34.5–45)
HGB BLD-MCNC: 11.3 G/DL — LOW (ref 11.5–15.5)
IANC: 11.09 K/UL — HIGH (ref 1.8–7.4)
IMM GRANULOCYTES NFR BLD AUTO: 1 % — HIGH (ref 0–0.9)
LYMPHOCYTES # BLD AUTO: 21.8 % — SIGNIFICANT CHANGE UP (ref 13–44)
LYMPHOCYTES # BLD AUTO: 3.38 K/UL — HIGH (ref 1–3.3)
MCHC RBC-ENTMCNC: 20.8 PG — LOW (ref 27–34)
MCHC RBC-ENTMCNC: 30.7 GM/DL — LOW (ref 32–36)
MCV RBC AUTO: 67.6 FL — LOW (ref 80–100)
MONOCYTES # BLD AUTO: 0.74 K/UL — SIGNIFICANT CHANGE UP (ref 0–0.9)
MONOCYTES NFR BLD AUTO: 4.8 % — SIGNIFICANT CHANGE UP (ref 2–14)
NEUTROPHILS # BLD AUTO: 11.09 K/UL — HIGH (ref 1.8–7.4)
NEUTROPHILS NFR BLD AUTO: 71.5 % — SIGNIFICANT CHANGE UP (ref 43–77)
NRBC # BLD: 0 /100 WBCS — SIGNIFICANT CHANGE UP (ref 0–0)
NRBC # FLD: 0 K/UL — SIGNIFICANT CHANGE UP (ref 0–0)
PLATELET # BLD AUTO: 416 K/UL — HIGH (ref 150–400)
RBC # BLD: 5.44 M/UL — HIGH (ref 3.8–5.2)
RBC # FLD: 16.3 % — HIGH (ref 10.3–14.5)
RH IG SCN BLD-IMP: POSITIVE — SIGNIFICANT CHANGE UP
WBC # BLD: 15.49 K/UL — HIGH (ref 3.8–10.5)
WBC # FLD AUTO: 15.49 K/UL — HIGH (ref 3.8–10.5)

## 2022-10-29 PROCEDURE — 76810 OB US >/= 14 WKS ADDL FETUS: CPT | Mod: 26

## 2022-10-29 PROCEDURE — 76805 OB US >/= 14 WKS SNGL FETUS: CPT | Mod: 26

## 2022-10-29 PROCEDURE — 59025 FETAL NON-STRESS TEST: CPT | Mod: 26

## 2022-10-29 RX ORDER — OXYCODONE HYDROCHLORIDE 5 MG/1
5 TABLET ORAL ONCE
Refills: 0 | Status: DISCONTINUED | OUTPATIENT
Start: 2022-10-29 | End: 2022-10-31

## 2022-10-29 RX ORDER — TETANUS TOXOID, REDUCED DIPHTHERIA TOXOID AND ACELLULAR PERTUSSIS VACCINE, ADSORBED 5; 2.5; 8; 8; 2.5 [IU]/.5ML; [IU]/.5ML; UG/.5ML; UG/.5ML; UG/.5ML
0.5 SUSPENSION INTRAMUSCULAR ONCE
Refills: 0 | Status: DISCONTINUED | OUTPATIENT
Start: 2022-10-29 | End: 2022-10-31

## 2022-10-29 RX ORDER — HYDROCORTISONE 1 %
1 OINTMENT (GRAM) TOPICAL EVERY 6 HOURS
Refills: 0 | Status: DISCONTINUED | OUTPATIENT
Start: 2022-10-29 | End: 2022-10-31

## 2022-10-29 RX ORDER — LANOLIN
1 OINTMENT (GRAM) TOPICAL EVERY 6 HOURS
Refills: 0 | Status: DISCONTINUED | OUTPATIENT
Start: 2022-10-29 | End: 2022-10-31

## 2022-10-29 RX ORDER — SODIUM CHLORIDE 9 MG/ML
3 INJECTION INTRAMUSCULAR; INTRAVENOUS; SUBCUTANEOUS EVERY 8 HOURS
Refills: 0 | Status: DISCONTINUED | OUTPATIENT
Start: 2022-10-29 | End: 2022-10-31

## 2022-10-29 RX ORDER — SIMETHICONE 80 MG/1
80 TABLET, CHEWABLE ORAL EVERY 4 HOURS
Refills: 0 | Status: DISCONTINUED | OUTPATIENT
Start: 2022-10-29 | End: 2022-10-31

## 2022-10-29 RX ORDER — ACETAMINOPHEN 500 MG
975 TABLET ORAL
Refills: 0 | Status: DISCONTINUED | OUTPATIENT
Start: 2022-10-29 | End: 2022-10-31

## 2022-10-29 RX ORDER — IBUPROFEN 200 MG
600 TABLET ORAL EVERY 6 HOURS
Refills: 0 | Status: DISCONTINUED | OUTPATIENT
Start: 2022-10-29 | End: 2022-10-31

## 2022-10-29 RX ORDER — DIPHENHYDRAMINE HCL 50 MG
25 CAPSULE ORAL EVERY 6 HOURS
Refills: 0 | Status: DISCONTINUED | OUTPATIENT
Start: 2022-10-29 | End: 2022-10-31

## 2022-10-29 RX ORDER — BENZOCAINE 10 %
1 GEL (GRAM) MUCOUS MEMBRANE EVERY 6 HOURS
Refills: 0 | Status: DISCONTINUED | OUTPATIENT
Start: 2022-10-29 | End: 2022-10-31

## 2022-10-29 RX ORDER — OXYTOCIN 10 UNIT/ML
333.33 VIAL (ML) INJECTION
Qty: 20 | Refills: 0 | Status: DISCONTINUED | OUTPATIENT
Start: 2022-10-29 | End: 2022-10-29

## 2022-10-29 RX ORDER — SODIUM CHLORIDE 9 MG/ML
1000 INJECTION, SOLUTION INTRAVENOUS
Refills: 0 | Status: DISCONTINUED | OUTPATIENT
Start: 2022-10-29 | End: 2022-10-29

## 2022-10-29 RX ORDER — MAGNESIUM HYDROXIDE 400 MG/1
30 TABLET, CHEWABLE ORAL
Refills: 0 | Status: DISCONTINUED | OUTPATIENT
Start: 2022-10-29 | End: 2022-10-31

## 2022-10-29 RX ORDER — ACETAMINOPHEN 500 MG
2 TABLET ORAL
Qty: 0 | Refills: 0 | DISCHARGE

## 2022-10-29 RX ORDER — KETOROLAC TROMETHAMINE 30 MG/ML
30 SYRINGE (ML) INJECTION ONCE
Refills: 0 | Status: DISCONTINUED | OUTPATIENT
Start: 2022-10-29 | End: 2022-10-29

## 2022-10-29 RX ORDER — IBUPROFEN 200 MG
600 TABLET ORAL EVERY 6 HOURS
Refills: 0 | Status: COMPLETED | OUTPATIENT
Start: 2022-10-29 | End: 2023-09-27

## 2022-10-29 RX ORDER — AER TRAVELER 0.5 G/1
1 SOLUTION RECTAL; TOPICAL EVERY 4 HOURS
Refills: 0 | Status: DISCONTINUED | OUTPATIENT
Start: 2022-10-29 | End: 2022-10-31

## 2022-10-29 RX ORDER — OXYTOCIN 10 UNIT/ML
333.33 VIAL (ML) INJECTION
Qty: 20 | Refills: 0 | Status: DISCONTINUED | OUTPATIENT
Start: 2022-10-29 | End: 2022-10-31

## 2022-10-29 RX ORDER — DIBUCAINE 1 %
1 OINTMENT (GRAM) RECTAL EVERY 6 HOURS
Refills: 0 | Status: DISCONTINUED | OUTPATIENT
Start: 2022-10-29 | End: 2022-10-31

## 2022-10-29 RX ORDER — PRAMOXINE HYDROCHLORIDE 150 MG/15G
1 AEROSOL, FOAM RECTAL EVERY 4 HOURS
Refills: 0 | Status: DISCONTINUED | OUTPATIENT
Start: 2022-10-29 | End: 2022-10-31

## 2022-10-29 RX ORDER — IBUPROFEN 200 MG
3 TABLET ORAL
Qty: 0 | Refills: 0 | DISCHARGE

## 2022-10-29 RX ORDER — OXYCODONE HYDROCHLORIDE 5 MG/1
5 TABLET ORAL
Refills: 0 | Status: DISCONTINUED | OUTPATIENT
Start: 2022-10-29 | End: 2022-10-31

## 2022-10-29 RX ORDER — ACETAMINOPHEN 500 MG
3 TABLET ORAL
Qty: 0 | Refills: 0 | DISCHARGE
Start: 2022-10-29

## 2022-10-29 RX ORDER — IBUPROFEN 200 MG
1 TABLET ORAL
Qty: 0 | Refills: 0 | DISCHARGE
Start: 2022-10-29

## 2022-10-29 RX ADMIN — Medication 975 MILLIGRAM(S): at 23:33

## 2022-10-29 RX ADMIN — Medication 1000 MILLIUNIT(S)/MIN: at 20:22

## 2022-10-29 RX ADMIN — Medication 30 MILLIGRAM(S): at 21:33

## 2022-10-29 NOTE — OB POSTPARTUM EVENT NOTE - NS_EVENTFINDINGS1_OBGYN_ALL_OB_FT
Patient is asymtomatic and VSS. Ok to transfer patient to post partum floor as per MD Bryant. Patient is asymtomatic and VSS. Ok to transfer patient to post partum floor as per MD Segura.

## 2022-10-29 NOTE — OB PROVIDER H&P - NS ATTEND AMEND GEN_ALL_CORE FT
OB Attending    Agree with above.  Pt admitted in active labor.  Pregnancy complicated by fetal VSD, poor fetal growth, poorly controlled GDM.    MD Devin

## 2022-10-29 NOTE — DISCHARGE NOTE OB - MATERIALS PROVIDED
[facility] [place] Screening [place]  Immunization Record/Breastfeeding Log/Guide to Postpartum Care/Shaken Baby Prevention Handout/Birth Certificate Instructions/MMR Vaccination (VIS Pub Date: 2012)/Tdap Vaccination (VIS Pub Date: 2012)

## 2022-10-29 NOTE — OB RN PATIENT PROFILE - NSTRANFUSIONOBJECTION_GEN_ALL_CORE_SIUH
LVM notifying pt referral sent per her request  
Patient is contacting the office to state she needs a new referral for Physical Therapy  to be faxed to [facility].    The fax number is [phone].    Patient states to note on the referral to \"please call patient\"    Patient is requesting a call once the referral has been faxed.    She states to contact her back at [phone] and to leave detailed VM if needed.  
Referral printed and faxed to provided fax #  
Patient has no objection to blood transfusions.

## 2022-10-29 NOTE — OB PROVIDER H&P - NSMATERNALFETALCONCERNS_OBGYN_ALL_OB_FT
Fetal Alert  9.8.22: Small, mid-muscular ventricular septal defect, in close proximity to the moderator band noted on fetal echo. Nonurgent, outpatient  pediatric cardiology evaluation recommended in ~ 2-4 weeks(s) of age, sooner if there is a clinical concern. Christel Hernandez RN.

## 2022-10-29 NOTE — OB PROVIDER H&P - HISTORY OF PRESENT ILLNESS
31 yo , EGA@38 weeks, presented to D&T with c/o contractions irregular, every 2-3 minutes, denies vaginal bleeding, leakage of fluid, and reports positive fetal movement.  Denies fever, chills, headaches, changes in vision, chest pain, palpitations, shortness of breath, cough, nausea, vomiting, diarrhea, constipation, urinary symptoms, edema.    Prenatal care with Dr. acevedo  Prenatal course is complicated by GDM, Poorly controlled  fetus has VSD- S/P Fetal echo-follow up post delivery.     GBS status is negative 10/20/2022  Patient denies signs and symptoms of COVID 19; denies symptomatic illness; fully vaccinated.    No adverse reactions to anesthesia, no objections to blood transfusions if clinically indicated.  OB hx: TOP x 1 with D&C 2014  10/26/2021  @ weeks M Coarctation of the Aorta, S/P Surgery  Med hx: denies  Surg hx: D&C x 1  GYN hx: denies hx of abnormal papsmear/cysts/fibroids/STDs  Meds: PNV, ION  Allergies: NKDA    Social hx: Denies alcohol, tobacco, drug use  Psych hx: denies hx of anxiety/depression; lives with spouse and son     33 yo , EGA@38 weeks, presented to D&T with c/o contractions irregular, every 2-3 minutes, denies vaginal bleeding, leakage of fluid, and reports positive fetal movement.  Denies fever, chills, headaches, changes in vision, chest pain, palpitations, shortness of breath, cough, nausea, vomiting, diarrhea, constipation, urinary symptoms, edema.    Prenatal care with Dr. acevedo  Prenatal course is complicated by GDM-1, Poorly controlled  fetus has VSD- S/P Fetal echo-follow up post delivery.     GBS status is negative 10/20/2022  Patient denies signs and symptoms of COVID 19; denies symptomatic illness; fully vaccinated.    No adverse reactions to anesthesia, no objections to blood transfusions if clinically indicated.  OB hx: TOP x 1 with D&C 2014  10/26/2021  @ weeks M Coarctation of the Aorta, S/P Surgery  Med hx: denies  Surg hx: D&C x 1  GYN hx: denies hx of abnormal papsmear/cysts/fibroids/STDs  Meds: PNV, ION  Allergies: NKDA    Social hx: Denies alcohol, tobacco, drug use  Psych hx: denies hx of anxiety/depression; lives with spouse and son

## 2022-10-29 NOTE — DISCHARGE NOTE OB - MEDICATION SUMMARY - MEDICATIONS TO TAKE
I will START or STAY ON the medications listed below when I get home from the hospital:    acetaminophen 325 mg oral tablet  -- 3 tab(s) by mouth every 6 hours  -- Indication: For pain    ibuprofen 600 mg oral tablet  -- 1 tab(s) by mouth every 6 hours  -- Indication: For pain    Prenatal Multivitamins with Folic Acid 1 mg oral tablet  -- 1 tab(s) by mouth once a day  -- Indication: For supplement

## 2022-10-29 NOTE — OB POSTPARTUM EVENT NOTE - NS_EVENTSUMMARY1_OBGYN_ALL_OB_FT
Post partum orthostatic blood pressure taken on patient. drop of 20 in diastolic blood pressure from lying to sitting. Patient asymptomatic. Denies dizziness, lightheadedness, shortness of breath, palpitations, weakness.

## 2022-10-29 NOTE — OB PROVIDER TRIAGE NOTE - NSMATERNALFETALCONCERNS_OBGYN_ALL_OB_FT
Fetal Alert  9.8.22: Small, mid-muscular ventricular septal defect, in close proximity to the moderator band noted on fetal echo.Nonurgent, outpatient  pediatric cardiology evaluation recommended in ~ 2-4 weeks(s) of age, sooner if there is a clinical concern. Christel Hernandez RN.

## 2022-10-29 NOTE — OB RN PATIENT PROFILE - FUNCTIONAL ASSESSMENT - DAILY ACTIVITY SCORE HIDDEN
FYI - Patient called concerned about her heart racing. She states that she was in A-fib last night and almost went to the Emergency room. She was concerned about not having an appointment with Dr Jose Cain until April. I advised that she is still a patient here and if she needs seen sooner she can schedule her. Her April appointment was strictly a \"routine follow up\" I did advise her to go to the Walk-in or to the Emergency room if she is still experiencing symptoms. I moved her appointment to establish with Dr Jose Cain to Monday, as patient requested a sooner appointment.    Patient understands and agrees to go to the emergency room if symptoms persist.
24

## 2022-10-29 NOTE — DISCHARGE NOTE OB - CARE PROVIDER_API CALL
Thuy Hyman)  Obstetrics and Gynecology  [address], [address]  [place] [address]  Phone: [phone]  Fax: [phone]  Follow Up Time:

## 2022-10-29 NOTE — OB PROVIDER H&P - NSHPPHYSICALEXAM_GEN_ALL_CORE
HR: 87 (29 Oct 2022 20:33) (87 - 101)  BP: 113/80 (29 Oct 2022 20:23) (113/80 - 113/80)  SpO2: 100% (29 Oct 2022 20:38) (100% - 100%)    Gen: NAD  Head: NC/AT  Cardio: S1S2+, RRR  Resp: CTABL, no wheezing  Abdomen: Soft, NT/ND, BS+  Extremities: No LE edema bilaterally    NST and BPP done to assess fetal surveillance and results as follows:  NST-->FHR: 135 HR baseline, moderate variability, accelerations present, no decelerations, Category 1.  [place]: Contractions present,     BPP:  vertex confirmed by bedside sonogram    SVE: 8/100/-1 with a bulging bag

## 2022-10-29 NOTE — OB RN TRIAGE NOTE - TERM DELIVERIES, OB PROFILE
Colonoscopy Checklist    Date of Surgery: 10/19/17   Time of Surgery: tba   Surgery Scheduled with: Dr. Barahona     Patient Preparation Checklist   Done - Patient viewed colonoscopy portion of \"Radha/Breann\" Colonoscopy video.   Done - Reviewed information from pamphlets (Colonoscopy from [facility]).   Done - Reviewed complications of procedure (bleeding, reactions to sedatives and perforation).   Done - Review prep, including liquid diet one day prior to the procedure. NPO after midnight the day of the procedure and how to mix, store and take medication.   Done - Review Colonoscopy prep sheet, including how to take medications prior to procedure.   Done - Reviewed \"Instructions for Your Colonoscopy\" instruction sheet.   Done - Reminded patient that he/she must be accompanied by an individual that can drive him/her home on the day of the procedure.         Signatures   Electronically signed by : ZAHEER Samuel; Sep 18 2017  3:33PM CST     1

## 2022-10-29 NOTE — DISCHARGE NOTE OB - HOSPITAL COURSE
admitted in labor.  precipitous  of viable female infant to NICU for low birth weight.
standing/walking/toileting

## 2022-10-29 NOTE — OB PROVIDER DELIVERY SUMMARY - NSPROVIDERDELIVERYNOTE_OBGYN_ALL_OB_FT
Pt fully dilated and pushing.  Precipitous delivery of viable infant over intact perineum.  Nose and mouth bulb suctioned.  Infant handed to mother.  Cord clamped and cut after delay. Cord gases sent.  Placenta delivered spontaneously and intact.  2nd degree laceration repaired with excellent hemostasis and restoration of anatomy.  Fundus firm.  Vault empty.

## 2022-10-29 NOTE — OB PROVIDER H&P - ASSESSMENT
31 yo , EGA@38 weeks active labor   Discuss with Dr. Dove.   Patient admitted for labor  For expectant management at this time       31 yo , EGA@38 weeks active labor   Discuss with Dr. Dove.   Patient admitted for labor  For expectant management at this time  For epi PRN  routine orders  meds ordered  covid pcr sent  consents signed by patient.    JOSE Long NP

## 2022-10-29 NOTE — DISCHARGE NOTE OB - PATIENT PORTAL LINK FT
You can access the FollowMyHealth Patient Portal offered by [facility] by registering at the following website: [email or web address]. By joining [facility] FollowMyHealth portal, you will also be able to view your health information using other applications (apps) compatible with our system.

## 2022-10-29 NOTE — DISCHARGE NOTE OB - NS MD DC FALL RISK RISK
For information on Fall & Injury Prevention, visit: [email or web address] OR  [email or web address] OR  https://www.cdc.gov/steadi/patient.html

## 2022-10-29 NOTE — OB RN DELIVERY SUMMARY - NS_SEPSISRSKCALC_OBGYN_ALL_OB_FT
GBS status in the 'Prenatal Lab tests/results section' on the OB RN Patient Profile must be documented.   EOS calculated successfully. EOS Risk Factor: 0.5/1000 live births ([facility] national incidence); GA=38w;Temp=98.78; ROM=0.033; GBS='Negative'; Antibiotics='No antibiotics or any antibiotics < 2 hrs prior to birth'

## 2022-10-29 NOTE — OB PROVIDER DELIVERY SUMMARY - NS_[id number]_OBGYN_ALL_OB
Date: 10/27/2022    Supervising Physician: Rosales Scruggs M.D.    Date of Surgery: 7/29/22     Procedure: C5-7 ACDF    History: Regine Osorio is seen today for follow-up following the above listed procedure. Overall the patient is doing well but today notes she is having some neck stiffness as well as frozen left shoulder. However, she says her stiffness greatly improved when she removed the neck brace. She also reports some persistent trouble swallowing but says it has improved since surgery. Pain is well controlled with current pain medication.  she denies fever, chills, and sweats since the time of the surgery.       Exam: Incision is healing well, clean, dry and intact.   There is no sign of infection. Neuro exam is stable. No signs of DVT.    Radiographs: hardware in place no failure    Assessment/Plan: 3 months post op.    Doing well postoperatively.  reviewed. Will send PT orders for neck and left shoulder. No restrictions.    I will plan to see the patient back for the next postop visit in 1 years.     Thank you for the opportunity to participate in this patient's care. Please give me a call if there are any concerns or questions.        
Yes

## 2022-10-30 LAB
COVID-19 SPIKE DOMAIN AB INTERP: POSITIVE
COVID-19 SPIKE DOMAIN ANTIBODY RESULT: >250 U/ML — HIGH
SARS-COV-2 IGG+IGM SERPL QL IA: >250 U/ML — HIGH
SARS-COV-2 IGG+IGM SERPL QL IA: POSITIVE
SARS-COV-2 RNA SPEC QL NAA+PROBE: SIGNIFICANT CHANGE UP

## 2022-10-30 RX ADMIN — Medication 600 MILLIGRAM(S): at 03:10

## 2022-10-30 RX ADMIN — Medication 975 MILLIGRAM(S): at 13:40

## 2022-10-30 RX ADMIN — Medication 975 MILLIGRAM(S): at 05:48

## 2022-10-30 RX ADMIN — Medication 975 MILLIGRAM(S): at 20:32

## 2022-10-30 RX ADMIN — Medication 1 APPLICATION(S): at 23:45

## 2022-10-30 RX ADMIN — Medication 600 MILLIGRAM(S): at 02:29

## 2022-10-30 RX ADMIN — SODIUM CHLORIDE 3 MILLILITER(S): 9 INJECTION INTRAMUSCULAR; INTRAVENOUS; SUBCUTANEOUS at 05:48

## 2022-10-30 RX ADMIN — Medication 600 MILLIGRAM(S): at 09:31

## 2022-10-30 RX ADMIN — Medication 975 MILLIGRAM(S): at 21:21

## 2022-10-30 RX ADMIN — Medication 975 MILLIGRAM(S): at 06:19

## 2022-10-30 RX ADMIN — Medication 975 MILLIGRAM(S): at 12:48

## 2022-10-30 RX ADMIN — PRAMOXINE HYDROCHLORIDE 1 APPLICATION(S): 150 AEROSOL, FOAM RECTAL at 23:45

## 2022-10-30 RX ADMIN — Medication 600 MILLIGRAM(S): at 18:25

## 2022-10-30 RX ADMIN — Medication 975 MILLIGRAM(S): at 00:10

## 2022-10-30 RX ADMIN — Medication 600 MILLIGRAM(S): at 08:51

## 2022-10-30 RX ADMIN — Medication 600 MILLIGRAM(S): at 23:44

## 2022-10-30 NOTE — PROGRESS NOTE ADULT - SUBJECTIVE AND OBJECTIVE BOX
S: Patient doing well.   Pain controlled.  +OOB.  +void.  Tolerating PO.  Lochia WNL.    O: Vital Signs Last 24 Hrs  T(C): 36.7 (30 Oct 2022 18:16), Max: 37.1 (29 Oct 2022 20:42)  T(F): 98.1 (30 Oct 2022 18:16), Max: 98.8 (29 Oct 2022 21:53)  HR: 88 (30 Oct 2022 18:56) (69 - 103)  BP: 97/61 (30 Oct 2022 18:16) (93/51 - 116/67)  BP(mean): --  RR: 16 (30 Oct 2022 18:16) (16 - 18)  SpO2: 97% (30 Oct 2022 18:16) (88% - 100%)    Parameters below as of 30 Oct 2022 18:16  Patient On (Oxygen Delivery Method): room air        Gen: NAD  Abd: soft, NT, ND.   fundus firm below umbilicus, NT.  Ext: no tenderness B/L, negative Huey's sign    Labs:                        11.3   15.49 )-----------( 416      ( 29 Oct 2022 21:10 )             36.8       ABO Interpretation: B (10-29 @ 21:10)    Rh Interpretation: Positive (10-29 @ 21:10)    Antibody Screen Negative            A: 32y PPD#1 s/p  doing well.   -Continue routine postpartum care.  -Discharge planning.     MD Devin

## 2022-10-31 ENCOUNTER — APPOINTMENT (OUTPATIENT)
Dept: MATERNAL FETAL MEDICINE | Facility: CLINIC | Age: 32
End: 2022-10-31

## 2022-10-31 ENCOUNTER — NON-APPOINTMENT (OUTPATIENT)
Age: 32
End: 2022-10-31

## 2022-10-31 VITALS
RESPIRATION RATE: 17 BRPM | TEMPERATURE: 98 F | DIASTOLIC BLOOD PRESSURE: 63 MMHG | HEART RATE: 96 BPM | OXYGEN SATURATION: 100 % | SYSTOLIC BLOOD PRESSURE: 98 MMHG

## 2022-10-31 LAB — T PALLIDUM AB TITR SER: NEGATIVE — SIGNIFICANT CHANGE UP

## 2022-10-31 RX ADMIN — Medication 975 MILLIGRAM(S): at 06:14

## 2022-10-31 RX ADMIN — Medication 975 MILLIGRAM(S): at 05:45

## 2022-10-31 RX ADMIN — Medication 600 MILLIGRAM(S): at 00:43

## 2022-10-31 RX ADMIN — Medication 600 MILLIGRAM(S): at 05:43

## 2022-10-31 RX ADMIN — Medication 600 MILLIGRAM(S): at 06:14

## 2022-11-03 ENCOUNTER — APPOINTMENT (OUTPATIENT)
Dept: MATERNAL FETAL MEDICINE | Facility: CLINIC | Age: 32
End: 2022-11-03

## 2022-11-21 LAB — SURGICAL PATHOLOGY STUDY: SIGNIFICANT CHANGE UP

## 2022-12-06 NOTE — OB RN DELIVERY SUMMARY - NSSELHIDDEN_OBGYN_ALL_OB_FT
The staff and providers of Non-interventional Pain Management would like to THANK YOU!  Thank you for utilizing the non-interventional chronic pain management service and [facility] as your healthcare provider.   Our goal is to always provide you with the best of care and we continue to look for better ways to improve the service we provide you.   You may receive a survey in the mail with questions specific to your encounter with our clinic. Should you receive a survey, please take a few minutes to rate your experience with your visit. Our providers and staff value your opinions and insights.  Thank you in advance for your time and interest!  Sophia Contreras NP      If you need medication a refill, please make sure you are contacting our office 5-7 business days prior to running out (requests received later will not be considered urgent).     If you miss an appointment, please understand that you may not be granted refills or you may be provided with a partial refill to get you through to your next appointment. This is up to provider discretion. If you miss too many appointments, medications may be reduced, weaned, or stopped.     You may reach us at [phone].    If you are prescribed an opiate medication, you must bring those pills with you in the original packaging to every single visit. Failure to do so could result in termination of your agreement with our practice.      Refill dates: 12/14 and 1/13       [[email or web address]_ALL_OB_FT:[id number]==],[NS_DeliveryRN_OBGYN_ALL_OB_FT:[id number]=]

## 2023-07-17 NOTE — OB NEONATOLOGY/PEDIATRICIAN DELIVERY SUMMARY - BABY A: APGAR 1 MIN COLOR, DELIVERY
Goal Outcome Evaluation:           Progress: no change  Outcome Evaluation: Stable. Blood sugar monitored. No changes noted.          (1) body pink, extremities blue

## 2023-09-24 NOTE — DISCHARGE NOTE OB - BECAUSE OF A PHYSICAL, MENTAL OR EMOTIONAL CONDITION, DO YOU HAVE DIFFICULTY DOING  ERRANDS ALONE LIKE VISITING A DOCTOR'S OFFICE OR SHOPPING (15 YEARS AND OLDER)
Patient on call light requesting medication for anxiety.  Patient offered bedtime seroquel that was missed at bedtime when patient was sleeping. She declined seroquel at this time, requesting PRN Ativan instead. PRN Ativan administered. Patient denies other needs at this time.    No

## 2023-10-18 NOTE — DISCHARGE NOTE OB - PROVIDER RX CONTACT NUMBER
Post-Care Instructions: I reviewed with the patient in detail post-care instructions. Patient is to avoid sunlight for the next 2 days, and wear sun protection. Patients may expect sunburn like redness, discomfort and scabbing. [phone]

## 2023-10-31 NOTE — OB RN PATIENT PROFILE - PRO PRENATAL LABS ORI SOURCE HIV
Health Maintenance Due   Topic Date Due   • Influenza Vaccine (1) 09/01/2023   • COVID-19 Vaccine (4 - 2023-24 season) 09/01/2023       Patient is due for topics as listed above but is not proceeding with Immunization(s) COVID-19 at this time. Orders placed for Immunization(s) Influenza.     hard copy, drawn during this pregnancy

## 2023-12-04 NOTE — OB RN PATIENT PROFILE - ALERT: PERTINENT HISTORY
"    [facility]  Outpatient Child & Adolescent Psychiatry New Patient Evaluation      Chief Complaint/HPI   Attending Supervising Provider: Dr. Homans MD, Child and Adolescent Psychiatry  Trainee Provider:  Dr. Sandi CUEVAS, Child and Adolescent Psychiatry  I reviewed the medical notes and discussed the patient's care/history with the patient and guardian/s.     This patient is being seen as a New Intake for depression, anxiety, and attentional problems +/- appropriate therapeutic interventions.     HPI:    Elaine Thomason is a 16 year old, female with previous diagnoses of ADHD (predominantly inattentive type), persistent depressive disorder, generalized anxiety disorder, and major depressive disorder, who was referred by TOMASA Clemons CNP for evaluation of depression.    Upon interview with Milli and her mother, Cheryl:  Per Milli, she has been experiencing \"bad depression\" since Covid. Before that it was already a \"big struggle\" but it got worse around Covid. It has been very hard to get herself to do anything with school. She understands things at school but also has a hard time with chores and assignments. They feel \"impossible.\" She also has anxiety and ADHD.    She first noticed her depression in fifth grade. She states that nothing \"huge or traumatic\" was happening back then but she started to struggle more with enjoying things. She could still find something to enjoy but that is when she first remembered struggling with not enjoying things or hating her life. She did not seek help at that time because it was \"not too prominent.\" Her mom was surprised when she heard about Milli being depressed back then. She offered to get Milli into therapy at that time but Milli was not interested at that time. Milli's mom notes that she did not look depressed back then. Milli thinks that she was also anxious back then but notes that it is \"hard to remember the details.\" She does not think " "that she was aware of the anxiety. Her mom notes that there was perfectionism back then (e.g. ripping up assignments that she felt were not good enough). Her teachers would note that she did really good work but that she would take her time and would sometimes need a quieter area to work. Per mom, she could not turn in assignments \"unless it had her seal of approval\" and she would be \"inconsolable\" if she did not feel like her assignments were up to par.    Covid started during her sixth grade year. She was already missing her friends from elementary school who went to a different middle school. As soon as Mina started, Milli remembered that she did not go to [employer] for school (with teachers and students). Schools were not ready for the transition. She remembers going from being a straight A student to failing all of her classes. Per mom, she struggled in school the following year. She would fall behind and then improve enough to get on the \"A Honor Roll.\" Seventh grade was hybrid and finished in person. Per mom, school work is a big stressor for Milli and her parents. In eighth grade, school got harder for her with regards to executive functioning skills, per mom. Mom watched a lot of TikTok and did research and concluded that Milli had ADHD. It took seven months to get in for testing.     Depression: First ever episode was back in fifth grade. She feels like at times it would be less severe but it she has not ever felt \"fully happy\" since then. She has had \"good moments\" or felt \"situationally happy.\" Mom notes that even when she has seemed to be doing well, Milli has been feeling hopeless and sad. Medication doses would continue to be increased but did not seem to be working (sertraline). When she is depressed she feels low and has decreased interest.  She continues to notice the low mood these days. She does not feel \"fully fulfilled\" by her activities. With regards to sleep, she will sleep all day. Right " "now she is working on not taking hour-long naps during the day. Mom notes that she used to sleep from 4-8 pm after school but since Milli has started the stimulant she has not been sleeping after school. Milli still notices that she wants to sleep sometimes. She gets about 9 hours of sleep per night these days and feels well-rested but will feel tired after school. This is mental exhaustion that becomes physical exhaustion. She continues to experience hopelessness, even when she is happy. She feels like she does not have a purpose. Energy fluctuates - sometimes she can have a lot of energy, it depends on the day. If she is feeling more depressed or like she has a lot of responsibilities, her energy is really low. Usually eats three meals a day but will sometimes skip her lunch for a month at school because she was not feeling hungry. Mom notes that she is \"racing out the door in the mornings\" so she does not always eat a big breakfast. She always eats dinner. Mom notes that back in sixth grade Milli was spending a lot of lunch periods in the bathroom. In the past two weeks, she has not been feeling \"super hungry,\" she has eaten lunch 3 times, dinner daily, breakfast 1/3 of the time, and snacks after school. Does experience psychomotor slowing when depressed but this is \"not a huge struggle, does not impact much of anything.\" She continues to have thoughts of \"disappearing... zap[ping] out of existence\" daily. These thoughts happen when she is not busy or focused on something. She states that she always has thoughts of wanting to disappear. The last time she had thoughts about wanting to end her life were a week and half ago. She was not thinking of plans at that time. At this time she was returning to school after being at [facility] [place]. She distracted herself from those thoughts. She states that even at her worst suicidal ideation, she did not have plans because she hates planning and does not have the energy to think about " "execution of any plan. Eventually she gets distracted.     Anxiety: Anxiety also started during fifth grade. It has improved in some situations but she feels like she has always been anxious. She has decreased anxiety during the summer because there is less to worry about but it will still be severe. Mom notes that multiple doctors at [facility] wondered if anxiety was the primary issue and the depression was secondary. Milli estimates that she spends 2-3 hours per day worrying but it occurs in 2-15 minute spurts. Worrying does not wake her up from sleep but it can make it hard to sleep sometimes. When her anxiety is really bad she notices that it is a lot harder to concentrate. If she is really stressed she will eat a lot of junk food. Mom thinks that Milli's anxiety makes her irritable. She notices muscle tension, but \"not a crazy amount.\" Milli worries about the past, things she has said and done, and the future (e.g. volleyball tryouts). She worries about what other people are thinking or how problems will be resolved. Also worries about \"philosophical stuff... existence and purpose\" and then this feeds into the depression. She feels like distractions help with her anxiety (such as playing [employer]). Anxiety is worsened by a lot of interaction with people, social media, and boredom. On social media, she compares her life to others'.     ADHD: Mom wonders if symptoms were present back in fourth and fifth grade when Milli was struggling with perfectionism. She feels like in the past it was hard to tell if she was struggling with attention and her mom notes that Milli was likely able to compensate when she was younger. Mom notes that once school got harder, Milli would fall behind and then she would have days where she caught up or she would just do really well on the test so that her grade would be okay. Milli gets so \"paralyzed\" that she can't even open her Chrome Book. Experiences difficulty managing " "concentration. She still notices difficulty getting into a \"focusing mindset.\" Mom states that teachers report that she does not really participate in class. Milli feels like it is hard to pay attention to long conversations, especially if people do not get straight to the point. She can feel easily distracted. She does not really lose thing. Mom notes that parents are heavily involved in helping her get ready for the day (setting out breakfast, selecting her shoes, grabbing her backpack). She has a hard time keeping track of things.     Medications: She has not noticed any \"bad side effects\" from the Effexor and Concerta. It is hard for her to tell if there are any improvements from her medications. Mom notices that she is sleeping less but she is worried because she has not seen Milli work on her homework. Milli does not feel like Concerta has had a noticeable difference. Mom feels like she has noticed Milli having more energy. Milli states that she has felt less interested in her thoughts of disappearing or \"any negative thoughts\" since starting the Effexor. No new headaches, negative changes in sleep, blurry vision, dry mouth, irregular or fast heart rate, chest pain, nausea, vomiting, decreased appetite, diarrhea, constipation, and involuntary movements. She has been on her current dose of Effexor for 3.5 weeks and her Concerta has been at this dose since last Monday (11/27).     Milli wants to be able to get herself to do things. She feels like her motivation is really lacking.     Upon interview alone: Patient denied having any other topics to discuss. She confirmed all information regarding suicidal ideation, substance use, and relationships that she reported in the presence of her mother.     Per CLAUDIO Orona's 11/28/23 note:  Milli really struggles with identity and self-esteem. Has trouble making friends, mom says she doesn't understand them or connect well. A recent close friendship ended really abruptly, " "Milli said she was not a nice girl and ended that friendship. She has had a few boyfriends, but all short lived. They last a few weeks and then they break up with her. Dad doesn't trust Milli with boys because she has lied about them before.      Was passionate about volleyball, but didn't make the team this year and was devastated. Has been playing at the gym with mom and tried out for club team but didn't make that either. Anxiety was really high surrounding club team try outs. Milli made the decision to try out. Mom is supportive, Dad was hesitant. Dad pressuring her to hurry the morning of. She texted mom very upset about dad, not wanting to see him ever again. Asked mom to come pick her up after try outs. Mom learned that in the car, Dad and Milli started to have a conflict, dad was making her anxiety worse. She told Dad to \"Shut the fuck up\" and dad exploded at her then dropped her off. She was crying and vomiting in her mouth during tryouts. When mom picked her up, she cried for 40 minutes straight. Dad stayed away from the house for a week because she didn't want to see him. Decided she wanted him home for Thanksgiving, so he returned at that time. Things have been going well between them since.      She is in [employer] and has a group of friends there. She recently stopped going to [employer] (for 4-5 weeks)     Before her hospitalization, a good friend of Milli's told Milli's mom that she was saying suicidal things. Mom went through her backpack and found about 13 letters addressed to various loved ones. Mom called school to ensure safety of Milli. This event was pretty traumatizing for Milli. Mom brought her to the emergency room and they admitted her to [facility]. She hated it but didn't protest. Mom feels she has learned things from [facility] but not enough. Went from inpatient to [facility] for 5 weeks. Discharged yesterday (11/28)....    Family began family therapy yesterday (11/28).      Dad was army " "sergeant and will use that voice at home. Milli idolizes Dad. Dad has a hard time being wrong and mom thinks that has an effect on Milli. Milli says it's her dad's voice in her head that criticizes her. Milli has witnessed mom and dad disagree and fight over who's right.     REVIEW OF SYSTEMS:   Psychiatric review of symptoms:    Depression: as per HPI  Cele/ hypomania:  no elevated mood lasting at least four days  Psychosis: denies auditory and visual hallucination  Anxiety: as per HPI  Post Traumatic Stress Disorder: denies trauma history  Body dysmorphic disorder: preoccupied with size of forehead, hair  Obsessive Compulsive Disorder: no obsessions or compulsions  Eating Disorders:  no food restriction. Sometimes keeps eating because food is around. No compensatory behaviors.   ADHD: as per HPI  Suicidal Ideation: As per HPI. No plan or intent.  Homicidal Ideation: Denies    Comprehensive review of physical systems: Denies physical concerns.       History:   Social history:   Patient currently lives with mom, dad, older brother, and dog.     School/grade - [employer], [address] [place] [address]/IEP - Yes, per CLAUDIO Orona's 11/28/23 note: \"ADHD; extra time for tests, can take a mental break in the office, uses fidgets, can choose groups for assignments, present to smaller groups for class assignments. \"    Romantic relationship -  no  Employment - None  Legal issues - none  [place] - none     Alcohol - none  Street drugs - none  Vape/smoke - none      Developmental history:  Patient was born at term.   Family denies pregnancy or delivery complications.   Family denies in utero substance exposure.   Developmental milestones on time.  Early intervention services were not needed.        Family psychiatric history:  Body dysmorphia/eating disorder - mother?  Alcohol use disorder - maternal uncle 1  Suicide attempts: maternal uncle 1, maternal uncle 2  Depression: maternal uncle 2, paternal grandmother, maternal " "grandfather?, mother, brother?, paternal aunt  Hospitalization: paternal grandmother  Anxiety: maternal grandfather  ADHD: mother, maternal grandmother?    Family suicides: maternal great-uncle     Medical history:  - No history of TBI, seizure, or loss of consciousness.  - No history of cardiac issues.     Surgical history:  - Pin placed for broken arm in fifth grade.    Psychiatric history:  - Historical Diagnoses: ADHD (predominantly inattentive type), persistent depressive disorder, generalized anxiety disorder, major depressive disorder  - Prev hospitalizations: [facility], 10 days inpatient in October 2023  - Prev PHP/IOP/RTC: [facility], 5 weeks, discharged 11/27/23  - Prev ECT/TMS: Not discussed    - Suicide attempts: 2, on the same day two years ago (attempted to drown herself in tub and then later tried to overdose on medications) - beginning of seventh grade  - SIB History: History of cutting. Last cut around September 20, 2023. Started cutting the summer after sixth grade.   - Violence/aggressive:  none  - Trauma history: none    - Neuro/Psych testing: Completed at [facility] by Ketty Gaines Psy.D., LP on 10/5/23 and 10/25/23.  - Outpatient psychiatrist: Previously saw TOMASA Clemons, CNP (last appointment on 8/29/23).  - Outpatient therapist: [facility] Alfredo [facility]  - : none  - PCP: Ellen Chin MD    - Psych Medications  --- Antidepressants: fluoxetine (made her feel \"amped up in a really, really bad way,\" took for less than a month), sertraline (max dose 175 mg daily, did not work), venlafaxine (current)  --- Stimulants:  methylphenidate IR, methylphenidate ER 18 mg (no benefit or side effects)  --- Sedatives/sleep: hydroxyzine (max dose 25 mg TID PRN, makes her feel very drowsy and groggy)    Allergies:     Allergies   Allergen Reactions    Amoxicillin      Urticaria on 8th day of medication     VITALS   /76 (BP Location: Right arm, Patient Position: " "Sitting, Cuff Size: Adult Small)   Pulse 96   Ht 1.56 m (5' 1.42\")   Wt 46.2 kg (101 lb 14.4 oz)   BMI 18.99 kg/m      MENTAL STATUS EXAM                                                                            Muscle Strength and Tone: normal on gross observation  Gait and Station: normal on gross observation    Mood: \"depressed, anxious\"  Affect: mood congruent, appropriately reactive  Appearance: Well-groomed, well-nourished, good hygiene, wearing casual clothing    Behavior/Demeanor/Attitude: Calm and cooperative with conversation.  Alertness: GCS 15/15 (E=4, V=5, M=6)  Eye Contact:  good  Speech: Clear, normal prosody, coherent,  Language: Fluent English language skills    Psychomotor Behavior: Normal, no evidence of extrapyramidal side effects or tics  Thought Process: Linear and goal-directed   Thought Content: no loosening of associations, no obsessions, compulsions, delusions, paranoia. Reports some concerns with appearance of forehead and hair.   Safety: Has thoughts of \"disappearing... zap[ping] out of existence\" daily, with no planning or intent. Denies homicidal ideation.  Perceptual abnormalities:   no auditory or visual hallucinations, no response to internal stimuli observed  Insight:  good  Judgment:  Good  Orientation:  Seems oriented to situation.  Attention Span and Concentration:  Attentive to conversation  Recent and Remote Memory:  Good   Fund of Knowledge:   Not formally assessed      LABS & IMAGING,  SCREENING,  TESTING                                                                                                               Recent Labs   Lab Test 05/02/23  1612   WBC 7.4   HGB 11.7   HCT 35.9   MCV 80        Recent Labs   Lab Test 05/02/23  1612      POTASSIUM 4.0   CHLORIDE 100   CO2 24   *   ASHVIN 9.5   MAG 2.1   BUN 8.8   CR 0.68   ALBUMIN 4.5   PROTTOTAL 7.6   AST 22   ALT <5*   ALKPHOS 89   BILITOTAL 0.2     Recent Labs   Lab Test 06/21/23  1001 " 05/02/23  1612   CHOL 131  --    LDL 76  --    HDL 48  --    TRIG 33  --    A1C  --  5.2     Recent Labs   Lab Test 05/02/23  1612   TSH 1.19       DIAGNOSES & PLAN:     Diagnoses:  - Attention deficit hyperactivity disorder, predominantly inattentive type  - Generalized anxiety disorder  - Persistent depressive disorder vs major depressive disorder, recurrent, in partial remission    Summary/Formulation:  Elaine Thomason is a 16 year old female with previous diagnoses of ADHD, BARBARA, PDD, and MDD who presents with ongoing symptoms of anxiety, depression, and inattention. She was recently hospitalized and participated in PHP, during which she started Effexor and stimulant medication. The Effexor has been helpful with regards to anxiety and mood, however she has not noticed any benefit from Concerta, which is at a very low dose. She is most concerned about her ability to start tasks and would like to address this first. Family history is significant for body dysmorphia/eating disorder, alcohol use disorder, suicide attempts and completed suicide, depression, anxiety, ADHD, and psychiatric hospitalization. Factors precipitating her recent hospitalization appear to include the end of a close friendship, conflict with her father and not making the volleyball team. Perpetuating factors include chronic symptoms of anxiety, depression, and ADHD and family dynamics. Protective factors include lack of substance use, engagement in treatment, supportive family, and family engagement in therapy.     I suspect that under-treated ADHD is contributing to Milli's anxiety and depression. Given her ongoing symptoms of ADHD, we will adjust her Concerta at weekly intervals to reach an effective dose.    Safety assessment:     Risk factors: SI, family history of suicide, peer issues, family dynamics, past behaviors, previous suicide attempts, history of depression, loss (relational, social, work, financial), and recent inpatient  admission  Protective factors: family support, engaged in treatment, and meaningfully engaged in safety planning  Overall risk for harm is low-moderate.  Based on risk level, patient is assessed to be appropriate for outpatient level of care.     Safety plan (hanging up at home):  Warning signs: not being involved in anything, failing classes, sleeping a lot, isolating, not eating  Things to distract herself: Sudoku, video games, going to the gym, watching television  People she can talk to: mom, therapist, brother, dad, best friend  Knows about crisis lines, would maybe call if needed. Knows about texting line.      PLAN  Nonpharmacological treatment:  - Safety plan at home:  See summary/MDM.  - Therapy plan:  Continue individual and family therapy.  - Academic interventions:  504 in place  - Other: Patient and guardian were informed that I will be on leave in January and February 2024 and that co-fellows will be covering for me.   - Next appt:  4 weeks with first year fellow     Medications (psychotropic):   The risks, benefits, alternatives, and side effects have been discussed and are understood by the patient and guardian.  - Continue Effexor  mg daily for mood and anxiety.  - Increase Concerta to 27 mg daily for ADHD. Patient and family to send a message in one week reporting on any benefits/side effects. Dose can be adjusted at that time.     Drug Monitoring:  [place] Prescription Monitoring Program [] was checked today:  indicates that controlled prescriptions have been filled as prescribed.  PSYCHOTROPIC DRUG INTERACTIONS: Per [employer]:.  Concurrent use of HYDROXYZINE and VENLAFAXINE may result in an increased risk of QT interval prolongation.       Attestation/Billing                                                                                                  This patient was evaluated by Dr. Junior today.   Patient was seen and staffed with attending Dr. Homans.  Total time 120 minutes spent on the  date of the encounter doing chart review, history and exam, documentation and further activities as noted above.       None

## 2024-01-01 NOTE — OB PROVIDER TRIAGE NOTE - NSOBPROVIDERNOTE_OBGYN_ALL_OB_FT
Yes
32 yo  @ 37.2 wks c/o brb at 2am while laying in bed, placed pad on filled pad and replaced and came here- no active bleeding currently. no pain. never had bleeding in this pregnancy before- hx of LLP last sono 10/15 reports LLP resolved and >2 cm away from internal os. pt reports has an vaginal infection and started first dose of antibiotics last evening. denies fever chills ha n/v new swelling vision changes epigastric pain cp sob or cough.    GBS: positive 10/23  meds: PNV iron and antibiotic  all: denies  PMH: denies  PSH: d&c  gyn hx: denies  ob hx: TOP 2013 x 1    d/w Dr Stubbs admit for [place] PROM Cat 2 tracing @ 37.2 wks  for PO Cytotec  for Ampicillin for +GBS  prenatals reviewed  covid swab sent  2 units PRBC on hold

## 2024-01-06 NOTE — OB PROVIDER LABOR PROGRESS NOTE - ASSESSMENT
A&Ox4. VSS. 2L of O2 via NC. . Congested cough w/ no sputum production at this time.   Telemetry: A. Fib  GI: Abdomen soft, nondistended. Passing gas.  Denies nausea.  : Urostomy leaking once overnight- replaced.   Pain controlled with PRN pain medications  Up with one assist and a walker.   Drains: KANDACE drain to the left side  Incisions: 4 lap sites w/ skin glue- clean, dry, and intact.   Diet: Tolerating low fiber/ soft diet   IVF running per order.  All appropriate safety measures in place. All questions and concerns addressed.     
Cervical balloon placed without incident. Instilled with 60/60ccs. Pt tolerated well.     Cat I tracing   For PO cytotec as per Dr Dove  Maintain cervical balloon    sredze, NP
Pt did well w/ exam.    - pitocin as tolerated    Amyeo Afroz Jereen, PGY-1  d/w Dr Hyman
Unable to tolerate exams  Pt agreeable to epidural followed by cervical balloon placement  Trasfer to PINO santillan, NP
32yo  IOL 2/2 PROM@2a. Patient stable and progressing well. Examined for prolonged decel; FHT restored to cat 1 after resuscitative measures and terbutaline x1.     Plan  - FHT cat 1   - CB still in situ  - Continue PO cytotec if >20min of cat I FHT  - GBS pos, receiving Ampicillin    d/w Dr. Boogie Marsh PGY2

## 2025-01-14 NOTE — OB RN TRIAGE NOTE - NS_MODEOFARRIVAL_OBGYN_ALL_OB
Patient's blood pressure is at goal.  Continue lisinopril hydrochlorothiazide 10/12.5 milligram    Orders:    lisinopril-hydrochlorothiazide (PRINZIDE,ZESTORETIC) 10-12.5 MG per tablet; Take 1 tablet by mouth daily     Car

## 2025-05-07 NOTE — OB PROVIDER H&P - NS_OBGYNHISTORY_OBGYN_ALL_OB_FT
APPOINTMENT / PROCEDURE INSTRUCTION    You are scheduled for a procedure, Epidural Steroid Injection - Cervical, with Daiana Cazares MD    *Failure to attend a procedural visit without prior notification may result in dismissal from the practice*     Vaccines     These should be avoided within 2 weeks before or 1 week after procedures with steroid, as the steroid can interfere with vaccine response.    If You Get Sick, Have an Infection or Have a Surgical Procedure, call us at [phone]. For your benefit, it is possible that the injection may need to be rescheduled.     Location and Arrival time    [facility] at [address], in the [facility].     Your procedure is scheduled on Tuesday, May 27, 2025 at 1:20 PM.  Please arrive at 12:50 PM.    Driving     You must have a  for the procedure that you are having. If you use public transportation, you must have a responsible adult accompany you.      Diet  You may eat as usual, though you may be more comfortable if you eat light.      Diagnostic procedure pain medication(s) hold instructions:    This hold is not applicable to the procedure being ordered. You may continue to take these as prescribed to you.       Blood thinner medication(s) hold instructions:   This medication hold is not applicable according to your medication list and review at the time the instructions were given to you. Please let us know if you get started on this type of medication before your procedure, so we can review if a hold is needed.     There is an increased risk of heart attack or stroke any time that this medication is held.    If these medication instructions must change, a nurse will call you with new instructions. If you don't hold your medications as instructed, your procedure may need to be cancelled.        NSAID medication hold instructions:  We are not holding these medications for your procedure. You can continue to take these as  prescribed to you.       Diabetic and Prediabetic patient specific instructions:  Please update your provider who manages your diabetes that you will be having a steriod injection in the next few weeks.   Steroid injections start to affect blood sugar levels soon after the injection and they can remain high for 3-10 days after.     Diabetic patient instruction  For procedures using steroid medication: Steroid injections start to affect blood sugar levels soon after the injection and they can remain high for 3-10 days after. If you are not feeling well (symptomatic), check your blood sugar- if it is too high for your normal, call the doctor who manages your diabetes or go to the nearest Urgent Care or Emergency Department.      Other Preparation:        Please notify the Day Surgery Staff if you have a pacemaker or implanted defibrillator   Shower or bathe on the morning of your procedure day to decrease the risk of infection.    *We understand that unplanned events may cause you to miss your appointments.  If you are unable to attend the appointment for your procedure, give us at least 24 hours of notice if possible.  Please be aware that if you miss your procedure appointment without notifying us in advance, we may no longer be able to serve you as your pain management provider.*    *Failure to follow these instructions may result in your injection / procedure being cancelled. *       RECOMMENDATIONS AND REFERRALS:  No orders of the defined types were placed in this encounter.     IMAGING  PLAN IMAGING: Will follow up by MyChart or phone call with next steps after imaging is reviewed.. Please note that due to staffing shortages, the radiology department may take up to 7 business days to generate a report.    MEDICATIONS      New Prescriptions     No medications on file      Please continue on your current medication plan as prescribed by your healthcare team.     In the presence of a neuropathic component of pain,  please trial the use of gabapentin. The goal dose of this medication is 300 mg three times a day. The major risks of taking gabapentin include sedation, mental status changes/forgetfulness, weight gain, misuse and swelling.      In order to determine how you tolerate this medication, follow the titration schedule as outlined below. You may increase as quickly as every 3-5 days if tolerated and may cut back to a previous dose if side effects are bothersome.      This is not a medication that works within an hour or so, but rather works over days to weeks by gradually bringing pain levels under control.      Gabapentin Titration Schedule           AM-Noon-PM  Step 1: 0-0-300  Step 2: 300-0-300  Step 3: 300-300-300           INTERVENTIONS     PLAN: cervical epidural steroid injection  WITH CATHETER to the C6-7 interspace *pending MRI results* (patient has metal allergy)  CPT 20059     In the presence of cervical radicular pain, the option of cervical epidural steroid injection using a catheter was chosen. This is an injection focused on decreasing irritation around a nerve root that is believed to be contributing to pain. Though rare, the risks of this procedure include infection, bleeding, and nerve damage. These risks and benefits were discussed with the patient. Will preemptively plan for procedure, but typically prior authorization must be obtained first. Patient understands that procedure will need to be delayed if prior authorization is declined. Peer to peers are not offered if lack of a course of physical therapy is the reason for denial.     Procedure Instructions:  Facility: [facility]   Admission Type: Day Surgery  Slot Time Needed: 20\" (You will arrive 30\" prior to the procedure)  Anesthesia: Local anesthetic  Special Equipment: None  Pacemaker: N/A  BMI       BMI Readings from Last 1 Encounters:   04/29/25 25.54 kg/m²         - : You will need a . Please do not drive or operate heavy  machinery on the entire day of the procedure.   - INFECTION: Please contact the clinic [phone] with any new signs of infection including rash, fever, etc or treatment for infection. Procedures will be rescheduled if there has been an infection in the last 2 weeks to avoid the risk of spine or joint infection.   - ALLERGIES: The procedure may include local anesthetic (e.g. lidocaine), steroid, contrast, and chloraprep cleaning solution. Please notify us right away if you have an allergy or intolerance.  - DIET: You may eat normally on the day of the procedure. You may prefer to not have a full stomach due to positioning. If you are diabetic, please watch your carbs as a high blood sugar may result in cancellation.   - GLP-1 DIABETES and WEIGHT LOSS TREATMENTS: N/A  - DIABETES and PREDIABETES MEDICATIONS: Not relevant to this procedure  - MEDICATIONS INCLUDING BLOOD THINNERS: Take your scheduled medications as prescribed (see separate instruction for blood thinner and diabetic / weight loss medication, if you take these). Please do not skip taking you blood pressure or heart medications- If your blood pressure is too high then the procedure may be cancelled.   - NSAIDs: You do not need to hold NSAID medications (ibuprofen, naproxen, nabumetone, meloxicam, aspirin, etc.) for this procedure.   - OTHER: Shower the night before your procedure or the morning of your procedure. Wash from your neck down to your feet with your usual soap. Don't put any products on the area to be injected, such as lotion, powder, or deodorant. On the day of your procedure, don't apply any pain patches and remove all jewelry, piercings, and fingernail polish.    This patient does not have an active medication from one of the medication groupers.   MULTIDISCIPLINARY CARE PLAN  PT: Please see a physical therapist for gentle conditioning exercises for management of pain. The focus of the physical therapy should be on improving muscle  strength and range of motion. The physical therapist would evaluate you and determine the best forms of therapy to accomplish these goals. You will continue your exercises at home.      FOLLOW UP  A copy of the initial report was sent to the referring clinician.   My clinical findings, medical decision-making, and treatment plan were discussed in depth with the patient, including a multimodal approach.      Thank you for consulting me in the care of Mr. Reeves. Please contact me with any additional questions.      Daiana Cazares MD, [facility]  Interventional Pain Medicine  [phone]     TOP x 1 with D&C 2014  10/26/2021  @ weeks M Coarctation of the Aorta, S/P Surgery

## 2025-07-23 NOTE — OB RN PATIENT PROFILE - EDUCATION PROVIDED ON BREASTFEEDING ASSESSMENT AND INSTRUCTION; INCLUDING POSITIONING, NEWBORN ATTACHMENT, AND COMFORT
Detail Level: Detailed Skin Checks Recommendations: Hyperpigmentation changes are stable and we will contine 6 month rechecks. Statement Selected